# Patient Record
Sex: MALE | Race: BLACK OR AFRICAN AMERICAN | NOT HISPANIC OR LATINO
[De-identification: names, ages, dates, MRNs, and addresses within clinical notes are randomized per-mention and may not be internally consistent; named-entity substitution may affect disease eponyms.]

---

## 2018-11-14 ENCOUNTER — APPOINTMENT (OUTPATIENT)
Dept: NUCLEAR MEDICINE | Facility: HOSPITAL | Age: 43
End: 2018-11-14

## 2019-01-30 ENCOUNTER — APPOINTMENT (OUTPATIENT)
Dept: SPINE | Facility: CLINIC | Age: 44
End: 2019-01-30
Payer: MEDICARE

## 2019-01-30 VITALS
WEIGHT: 168 LBS | DIASTOLIC BLOOD PRESSURE: 77 MMHG | SYSTOLIC BLOOD PRESSURE: 145 MMHG | HEART RATE: 80 BPM | HEIGHT: 67.5 IN | BODY MASS INDEX: 26.06 KG/M2

## 2019-01-30 DIAGNOSIS — Z80.0 FAMILY HISTORY OF MALIGNANT NEOPLASM OF DIGESTIVE ORGANS: ICD-10-CM

## 2019-01-30 DIAGNOSIS — Z82.49 FAMILY HISTORY OF ISCHEMIC HEART DISEASE AND OTHER DISEASES OF THE CIRCULATORY SYSTEM: ICD-10-CM

## 2019-01-30 DIAGNOSIS — Z83.3 FAMILY HISTORY OF DIABETES MELLITUS: ICD-10-CM

## 2019-01-30 DIAGNOSIS — Z86.69 PERSONAL HISTORY OF OTHER DISEASES OF THE NERVOUS SYSTEM AND SENSE ORGANS: ICD-10-CM

## 2019-01-30 PROCEDURE — 99204 OFFICE O/P NEW MOD 45 MIN: CPT

## 2019-01-30 RX ORDER — LOSARTAN POTASSIUM 50 MG/1
50 TABLET, FILM COATED ORAL
Refills: 0 | Status: ACTIVE | COMMUNITY

## 2019-01-30 RX ORDER — LEVOCETIRIZINE DIHYDROCHLORIDE 5 MG/1
5 TABLET, FILM COATED ORAL
Refills: 0 | Status: ACTIVE | COMMUNITY

## 2019-02-25 ENCOUNTER — OUTPATIENT (OUTPATIENT)
Dept: OUTPATIENT SERVICES | Facility: HOSPITAL | Age: 44
LOS: 1 days | End: 2019-02-25
Payer: MEDICARE

## 2019-02-25 ENCOUNTER — APPOINTMENT (OUTPATIENT)
Dept: MRI IMAGING | Facility: CLINIC | Age: 44
End: 2019-02-25
Payer: MEDICARE

## 2019-02-25 ENCOUNTER — APPOINTMENT (OUTPATIENT)
Dept: RADIOLOGY | Facility: CLINIC | Age: 44
End: 2019-02-25
Payer: MEDICARE

## 2019-02-25 DIAGNOSIS — G91.9 HYDROCEPHALUS, UNSPECIFIED: ICD-10-CM

## 2019-02-25 DIAGNOSIS — Z00.8 ENCOUNTER FOR OTHER GENERAL EXAMINATION: ICD-10-CM

## 2019-02-25 PROCEDURE — 70250 X-RAY EXAM OF SKULL: CPT

## 2019-02-25 PROCEDURE — 76499 UNLISTED DX RADIOGRAPHIC PX: CPT

## 2019-02-25 PROCEDURE — 71046 X-RAY EXAM CHEST 2 VIEWS: CPT | Mod: 26

## 2019-02-25 PROCEDURE — 70551 MRI BRAIN STEM W/O DYE: CPT | Mod: 26

## 2019-02-25 PROCEDURE — 71046 X-RAY EXAM CHEST 2 VIEWS: CPT

## 2019-02-25 PROCEDURE — 74018 RADEX ABDOMEN 1 VIEW: CPT | Mod: 26

## 2019-02-25 PROCEDURE — 70250 X-RAY EXAM OF SKULL: CPT | Mod: 26

## 2019-02-25 PROCEDURE — 70551 MRI BRAIN STEM W/O DYE: CPT

## 2019-03-07 ENCOUNTER — OUTPATIENT (OUTPATIENT)
Dept: OUTPATIENT SERVICES | Facility: HOSPITAL | Age: 44
LOS: 1 days | End: 2019-03-07
Payer: MEDICARE

## 2019-03-07 VITALS
HEIGHT: 67 IN | TEMPERATURE: 98 F | SYSTOLIC BLOOD PRESSURE: 150 MMHG | WEIGHT: 179.9 LBS | DIASTOLIC BLOOD PRESSURE: 90 MMHG | HEART RATE: 82 BPM | RESPIRATION RATE: 16 BRPM | OXYGEN SATURATION: 96 %

## 2019-03-07 DIAGNOSIS — G91.9 HYDROCEPHALUS, UNSPECIFIED: ICD-10-CM

## 2019-03-07 DIAGNOSIS — I10 ESSENTIAL (PRIMARY) HYPERTENSION: ICD-10-CM

## 2019-03-07 DIAGNOSIS — Z92.89 PERSONAL HISTORY OF OTHER MEDICAL TREATMENT: Chronic | ICD-10-CM

## 2019-03-07 DIAGNOSIS — Z98.2 PRESENCE OF CEREBROSPINAL FLUID DRAINAGE DEVICE: Chronic | ICD-10-CM

## 2019-03-07 DIAGNOSIS — K40.20 BILATERAL INGUINAL HERNIA, WITHOUT OBSTRUCTION OR GANGRENE, NOT SPECIFIED AS RECURRENT: Chronic | ICD-10-CM

## 2019-03-07 LAB
ANION GAP SERPL CALC-SCNC: 14 MMO/L — SIGNIFICANT CHANGE UP (ref 7–14)
BLD GP AB SCN SERPL QL: NEGATIVE — SIGNIFICANT CHANGE UP
BUN SERPL-MCNC: 14 MG/DL — SIGNIFICANT CHANGE UP (ref 7–23)
CALCIUM SERPL-MCNC: 9.2 MG/DL — SIGNIFICANT CHANGE UP (ref 8.4–10.5)
CHLORIDE SERPL-SCNC: 101 MMOL/L — SIGNIFICANT CHANGE UP (ref 98–107)
CO2 SERPL-SCNC: 24 MMOL/L — SIGNIFICANT CHANGE UP (ref 22–31)
CREAT SERPL-MCNC: 1.11 MG/DL — SIGNIFICANT CHANGE UP (ref 0.5–1.3)
GLUCOSE SERPL-MCNC: 102 MG/DL — HIGH (ref 70–99)
HCT VFR BLD CALC: 43.5 % — SIGNIFICANT CHANGE UP (ref 39–50)
HGB BLD-MCNC: 14.5 G/DL — SIGNIFICANT CHANGE UP (ref 13–17)
MCHC RBC-ENTMCNC: 29.9 PG — SIGNIFICANT CHANGE UP (ref 27–34)
MCHC RBC-ENTMCNC: 33.3 % — SIGNIFICANT CHANGE UP (ref 32–36)
MCV RBC AUTO: 89.7 FL — SIGNIFICANT CHANGE UP (ref 80–100)
NRBC # FLD: 0 K/UL — LOW (ref 25–125)
PLATELET # BLD AUTO: 228 K/UL — SIGNIFICANT CHANGE UP (ref 150–400)
PMV BLD: 11.2 FL — SIGNIFICANT CHANGE UP (ref 7–13)
POTASSIUM SERPL-MCNC: 3.7 MMOL/L — SIGNIFICANT CHANGE UP (ref 3.5–5.3)
POTASSIUM SERPL-SCNC: 3.7 MMOL/L — SIGNIFICANT CHANGE UP (ref 3.5–5.3)
RBC # BLD: 4.85 M/UL — SIGNIFICANT CHANGE UP (ref 4.2–5.8)
RBC # FLD: 13.3 % — SIGNIFICANT CHANGE UP (ref 10.3–14.5)
RH IG SCN BLD-IMP: POSITIVE — SIGNIFICANT CHANGE UP
SODIUM SERPL-SCNC: 139 MMOL/L — SIGNIFICANT CHANGE UP (ref 135–145)
WBC # BLD: 4.14 K/UL — SIGNIFICANT CHANGE UP (ref 3.8–10.5)
WBC # FLD AUTO: 4.14 K/UL — SIGNIFICANT CHANGE UP (ref 3.8–10.5)

## 2019-03-07 PROCEDURE — 93010 ELECTROCARDIOGRAM REPORT: CPT

## 2019-03-07 NOTE — H&P PST ADULT - GASTROINTESTINAL DETAILS
nontender/soft/no rebound tenderness/no rigidity/no organomegaly/no distention/no masses palpable/bowel sounds normal/no guarding/no bruit

## 2019-03-07 NOTE — H&P PST ADULT - RS GEN PE MLT RESP DETAILS PC
no wheezes/no rales/breath sounds equal/clear to auscultation bilaterally/no rhonchi/respirations non-labored

## 2019-03-07 NOTE — H&P PST ADULT - HISTORY OF PRESENT ILLNESS
43 yr old male with medical hx htn and hydrocephalus, pmhx stroke @ 11 yrs old during  shunt revision presents for preop evaluation with c/o right neck lump x ~ 9 months.  Pt was evaluated by  Neuro s/p MRI.  As per patient "the shunt is not working it has a slow leak".  Pt is now scheduled for Stereotactic Endoscopic Third Ventriculostomy, Possible Ventriculoperitoneal Shunt Revision on 03/18/19. 43 yr old male with medical hx htn and congenital hydrocephalus, pmhx stroke @ 11 yrs old during  shunt revision presents for preop evaluation with c/o right neck lump x ~ 9 months.  Pt was evaluated by  Neuro s/p MRI.  As per patient "the shunt is not working it has a slow leak".  Pt is now scheduled for Stereotactic Endoscopic Third Ventriculostomy, Possible Ventriculoperitoneal Shunt Revision on 03/18/19. 43 yr old male with medical hx htn and congenital hydrocephalus, pmhx stroke @ 11 yrs old during  shunt revision presents for preop evaluation with c/o right neck lump x ~ 9 months (of note patient is a poor historian.) Pt was evaluated by  Neuro s/p MRI.  As per patient "the shunt is not working it has a slow leak".  Pt is now scheduled for Stereotactic Endoscopic Third Ventriculostomy, Possible Ventriculoperitoneal Shunt Revision on 03/18/19.

## 2019-03-07 NOTE — H&P PST ADULT - PROBLEM SELECTOR PLAN 1
Scheduled for Stereotactic Endoscopic Third Ventriculostomy, Possible Ventriculoperitoneal Shunt Revision on 3/18/19.  Lab results pending.  Preop, famotidine and chlorhexidine instructions provided and questions addressed.

## 2019-03-07 NOTE — H&P PST ADULT - FAMILY HISTORY
Father  Still living? Unknown  Family history of hypertension, Age at diagnosis: Age Unknown     Mother  Still living? Yes, Estimated age: Age Unknown  Family history of hypertension, Age at diagnosis: Age Unknown     Sibling  Still living? Yes, Estimated age: Age Unknown  Family history of hypertension in brother, Age at diagnosis: Age Unknown

## 2019-03-07 NOTE — H&P PST ADULT - PROBLEM SELECTOR PLAN 2
Pt instructed to take med on the morning of procedure.  Pt is scheduled for medical evaluation on 03/11/19 for bp at pst 150/90.

## 2019-03-07 NOTE — H&P PST ADULT - NEGATIVE GASTROINTESTINAL SYMPTOMS
no vomiting/no constipation/no steatorrhea/no abdominal pain/no melena/no hematochezia/no jaundice/no hiccoughs/no diarrhea/no nausea

## 2019-03-07 NOTE — H&P PST ADULT - PSH
Bilateral inguinal hernia    History of creation of ventriculoperitoneal shunt  at couple months old in [place] - left shunt currently not working  S/P  shunt  at 11 yrs old at that time patient had stroke

## 2019-03-22 NOTE — ASU PATIENT PROFILE, ADULT - PSH
Bilateral inguinal hernia    History of creation of ventriculoperitoneal shunt  at couple months old in [place] - left shunt currently not working  S/P  shunt  at 11 yrs old at that time patient had stroke

## 2019-03-24 ENCOUNTER — TRANSCRIPTION ENCOUNTER (OUTPATIENT)
Age: 44
End: 2019-03-24

## 2019-03-25 ENCOUNTER — INPATIENT (INPATIENT)
Facility: HOSPITAL | Age: 44
LOS: 1 days | Discharge: HOME | End: 2019-03-27
Attending: NEUROLOGICAL SURGERY | Admitting: NEUROLOGICAL SURGERY
Payer: MEDICARE

## 2019-03-25 VITALS
RESPIRATION RATE: 16 BRPM | HEART RATE: 93 BPM | HEIGHT: 67 IN | DIASTOLIC BLOOD PRESSURE: 82 MMHG | SYSTOLIC BLOOD PRESSURE: 156 MMHG | OXYGEN SATURATION: 95 % | WEIGHT: 179.9 LBS | TEMPERATURE: 98 F

## 2019-03-25 DIAGNOSIS — K40.20 BILATERAL INGUINAL HERNIA, WITHOUT OBSTRUCTION OR GANGRENE, NOT SPECIFIED AS RECURRENT: Chronic | ICD-10-CM

## 2019-03-25 DIAGNOSIS — Z92.89 PERSONAL HISTORY OF OTHER MEDICAL TREATMENT: Chronic | ICD-10-CM

## 2019-03-25 DIAGNOSIS — G91.9 HYDROCEPHALUS, UNSPECIFIED: ICD-10-CM

## 2019-03-25 DIAGNOSIS — Z98.2 PRESENCE OF CEREBROSPINAL FLUID DRAINAGE DEVICE: Chronic | ICD-10-CM

## 2019-03-25 DIAGNOSIS — Z98.890 OTHER SPECIFIED POSTPROCEDURAL STATES: ICD-10-CM

## 2019-03-25 LAB
CLARITY CSF: CLEAR — SIGNIFICANT CHANGE UP
COLOR CSF: COLORLESS — SIGNIFICANT CHANGE UP
GLUCOSE CSF-MCNC: 34 MG/DL — LOW (ref 40–70)
GRAM STN CSF: SIGNIFICANT CHANGE UP
LYMPHOCYTES # CSF: 91 % — SIGNIFICANT CHANGE UP
MONOCYTES # CSF: 9 % — SIGNIFICANT CHANGE UP
NRBC NFR CSF: 1 CELL/UL — SIGNIFICANT CHANGE UP (ref 0–5)
PROT CSF-MCNC: 4.6 MG/DL — LOW (ref 15–40)
RBC # CSF: 99 CELL/UL — HIGH (ref 0–0)
RH IG SCN BLD-IMP: POSITIVE — SIGNIFICANT CHANGE UP
SPECIMEN SOURCE: SIGNIFICANT CHANGE UP
TOTAL CELLS COUNTED, SPINAL FLUID: 11 CELLS — SIGNIFICANT CHANGE UP
XANTHOCHROMIA: SIGNIFICANT CHANGE UP

## 2019-03-25 PROCEDURE — 70450 CT HEAD/BRAIN W/O DYE: CPT | Mod: 26

## 2019-03-25 RX ORDER — ACETAMINOPHEN 500 MG
650 TABLET ORAL EVERY 6 HOURS
Qty: 0 | Refills: 0 | Status: DISCONTINUED | OUTPATIENT
Start: 2019-03-25 | End: 2019-03-27

## 2019-03-25 RX ORDER — OXYCODONE HYDROCHLORIDE 5 MG/1
10 TABLET ORAL ONCE
Qty: 0 | Refills: 0 | Status: DISCONTINUED | OUTPATIENT
Start: 2019-03-25 | End: 2019-03-26

## 2019-03-25 RX ORDER — OXYCODONE HYDROCHLORIDE 5 MG/1
5 TABLET ORAL EVERY 4 HOURS
Qty: 0 | Refills: 0 | Status: DISCONTINUED | OUTPATIENT
Start: 2019-03-25 | End: 2019-03-27

## 2019-03-25 RX ORDER — INFLUENZA VIRUS VACCINE 15; 15; 15; 15 UG/.5ML; UG/.5ML; UG/.5ML; UG/.5ML
0.5 SUSPENSION INTRAMUSCULAR ONCE
Qty: 0 | Refills: 0 | Status: COMPLETED | OUTPATIENT
Start: 2019-03-25 | End: 2019-03-25

## 2019-03-25 RX ORDER — DOCUSATE SODIUM 100 MG
100 CAPSULE ORAL THREE TIMES A DAY
Qty: 0 | Refills: 0 | Status: DISCONTINUED | OUTPATIENT
Start: 2019-03-25 | End: 2019-03-27

## 2019-03-25 RX ORDER — FENTANYL CITRATE 50 UG/ML
50 INJECTION INTRAVENOUS
Qty: 0 | Refills: 0 | Status: DISCONTINUED | OUTPATIENT
Start: 2019-03-25 | End: 2019-03-26

## 2019-03-25 RX ORDER — SODIUM CHLORIDE 9 MG/ML
1000 INJECTION INTRAMUSCULAR; INTRAVENOUS; SUBCUTANEOUS
Qty: 0 | Refills: 0 | Status: DISCONTINUED | OUTPATIENT
Start: 2019-03-25 | End: 2019-03-25

## 2019-03-25 RX ORDER — LEVETIRACETAM 250 MG/1
500 TABLET, FILM COATED ORAL EVERY 12 HOURS
Qty: 0 | Refills: 0 | Status: DISCONTINUED | OUTPATIENT
Start: 2019-03-25 | End: 2019-03-27

## 2019-03-25 RX ORDER — SODIUM CHLORIDE 9 MG/ML
1000 INJECTION, SOLUTION INTRAVENOUS
Qty: 0 | Refills: 0 | Status: DISCONTINUED | OUTPATIENT
Start: 2019-03-25 | End: 2019-03-25

## 2019-03-25 RX ORDER — DEXAMETHASONE 0.5 MG/5ML
4 ELIXIR ORAL EVERY 6 HOURS
Qty: 0 | Refills: 0 | Status: DISCONTINUED | OUTPATIENT
Start: 2019-03-25 | End: 2019-03-27

## 2019-03-25 RX ORDER — LOSARTAN POTASSIUM 100 MG/1
50 TABLET, FILM COATED ORAL DAILY
Qty: 0 | Refills: 0 | Status: DISCONTINUED | OUTPATIENT
Start: 2019-03-25 | End: 2019-03-27

## 2019-03-25 RX ORDER — ONDANSETRON 8 MG/1
4 TABLET, FILM COATED ORAL ONCE
Qty: 0 | Refills: 0 | Status: COMPLETED | OUTPATIENT
Start: 2019-03-25 | End: 2019-03-25

## 2019-03-25 RX ORDER — FENTANYL CITRATE 50 UG/ML
25 INJECTION INTRAVENOUS
Qty: 0 | Refills: 0 | Status: DISCONTINUED | OUTPATIENT
Start: 2019-03-25 | End: 2019-03-26

## 2019-03-25 RX ORDER — CEFAZOLIN SODIUM 1 G
2000 VIAL (EA) INJECTION EVERY 8 HOURS
Qty: 0 | Refills: 0 | Status: COMPLETED | OUTPATIENT
Start: 2019-03-25 | End: 2019-03-26

## 2019-03-25 RX ORDER — SODIUM CHLORIDE 9 MG/ML
1000 INJECTION INTRAMUSCULAR; INTRAVENOUS; SUBCUTANEOUS
Qty: 0 | Refills: 0 | Status: DISCONTINUED | OUTPATIENT
Start: 2019-03-25 | End: 2019-03-26

## 2019-03-25 RX ORDER — OXYCODONE HYDROCHLORIDE 5 MG/1
10 TABLET ORAL EVERY 4 HOURS
Qty: 0 | Refills: 0 | Status: DISCONTINUED | OUTPATIENT
Start: 2019-03-25 | End: 2019-03-27

## 2019-03-25 RX ADMIN — ONDANSETRON 4 MILLIGRAM(S): 8 TABLET, FILM COATED ORAL at 17:09

## 2019-03-25 RX ADMIN — Medication 100 MILLIGRAM(S): at 23:29

## 2019-03-25 RX ADMIN — LEVETIRACETAM 500 MILLIGRAM(S): 250 TABLET, FILM COATED ORAL at 20:59

## 2019-03-25 RX ADMIN — OXYCODONE HYDROCHLORIDE 5 MILLIGRAM(S): 5 TABLET ORAL at 17:09

## 2019-03-25 RX ADMIN — Medication 4 MILLIGRAM(S): at 20:59

## 2019-03-25 RX ADMIN — SODIUM CHLORIDE 75 MILLILITER(S): 9 INJECTION INTRAMUSCULAR; INTRAVENOUS; SUBCUTANEOUS at 17:13

## 2019-03-25 NOTE — CONSULT NOTE ADULT - SUBJECTIVE AND OBJECTIVE BOX
SICU Consultation Note  =====================================================    Surgery Information  ***  Case Duration: 	EBL: 15ml	IV Fluids: 1000ml	Blood Products: 	Urine Output:   Complications:     HISTORY    HPI:  43 yr old male with medical hx htn and congenital hydrocephalus, pmhx stroke @ 11 yrs old during  shunt revision presents for preop evaluation with c/o right neck lump x ~ 9 months (of note patient is a poor historian.) Pt was evaluated by  Neuro s/p MRI.  As per patient "the shunt is not working it has a slow leak".  Pt is now POD0 for Stereotactic Endoscopic Third Ventriculostomy, Ventriculoperitoneal Shunt Revision on 03/18/19.  Surgery was uncomplicated, patient tolerated procedure well.  Resting comfortably in PACU, with no new neurologic deficits.    Allergies:   PAST MEDICAL & SURGICAL HISTORY:  Hydrocephalus  Stroke  Essential hypertension  Bilateral inguinal hernia  History of creation of ventriculoperitoneal shunt: at couple months old in [place] - left shunt currently not working  S/P  shunt: at 11 yrs old at that time patient had stroke    FAMILY HISTORY:  Family history of hypertension in brother (Sibling)  Family history of hypertension (Father, Mother): mother and father      SOCIAL HISTORY:  ***    ADVANCE DIRECTIVES: Presumed Full Code  ***    REVIEW OF SYSTEMS:  ***  General: Non-Contributory  Skin/Breast: Non-Contributory  Ophthalmologic: Non-Contributory  ENMT: Non-Contributory  Respiratory and Thorax: Non-Contributory  Cardiovascular: Non-Contributory  Gastrointestinal: Non-Contributory  Genitourinary: Non-Contributory  Musculoskeletal: Non-Contributory  Neurological: Non-Contributory  Psychiatric: Non-Contributory  Hematology/Lymphatics: Non-Contributory  Endocrine: Non-Contributory  Allergic/Immunologic: Non-Contributory    HOME MEDICATIONS:  ***    CURRENT MEDICATIONS:   --------------------------------------------------------------------------------------  Neurologic Medications  acetaminophen   Tablet .. 650 milliGRAM(s) Oral every 6 hours PRN Temp greater or equal to 38C (100.4F), Mild Pain (1 - 3)  fentaNYL    Injectable 25 MICROGram(s) IV Push every 5 minutes PRN Moderate Pain (4 - 6)  fentaNYL    Injectable 50 MICROGram(s) IV Push every 5 minutes PRN Severe Pain (7 - 10)  levETIRAcetam 500 milliGRAM(s) Oral every 12 hours  oxyCODONE    IR 5 milliGRAM(s) Oral every 4 hours PRN Moderate Pain (4 - 6)  oxyCODONE    IR 10 milliGRAM(s) Oral every 4 hours PRN Severe Pain (7 - 10)  oxyCODONE    IR 10 milliGRAM(s) Oral once PRN Severe Pain (7 - 10)    Respiratory Medications    Cardiovascular Medications  losartan 50 milliGRAM(s) Oral daily    Gastrointestinal Medications  docusate sodium 100 milliGRAM(s) Oral three times a day  lactated ringers. 1000 milliLiter(s) IV Continuous <Continuous>  sodium chloride 0.9%. 1000 milliLiter(s) IV Continuous <Continuous>    Genitourinary Medications    Hematologic/Oncologic Medications    Antimicrobial/Immunologic Medications  ceFAZolin   IVPB 2000 milliGRAM(s) IV Intermittent every 8 hours    Endocrine/Metabolic Medications  dexamethasone     Tablet 4 milliGRAM(s) Oral every 6 hours    Topical/Other Medications    --------------------------------------------------------------------------------------    VITAL SIGNS, INS/OUTS (last 24 hours):  --------------------------------------------------------------------------------------  ((Insert SICU Vitals / Is+Os here)) ***  --------------------------------------------------------------------------------------    EXAM  NEUROLOGY  RASS:   	GCS:    Exam: Normal, NAD, alert, oriented x 3, no focal deficits.  5/5 strength bilaterally.  Patient at baseline strength on left side.    HEENT  Exam: Normocephalic, atraumatic.  EOMI ***    RESPIRATORY  Exam: Lungs clear to auscultation, Normal expansion/effort.  ***  Mechanical Ventilation:     CARDIOVASCULAR  Exam: S1, S2.  Tachycardic, normal sinus rhythm.  No peripheral edema     GI/NUTRITION  Exam: Abdomen soft, Non-tender, Non-distended.    Wound:   ***  Current Diet:  NPO    VASCULAR  Exam: Extremities warm, pink, well-perfused.      MUSCULOSKELETAL  Exam: All extremities moving spontaneously without limitations.      SKIN:  Exam: Good skin turgor, no skin breakdown.      METABOLIC/FLUIDS/ELECTROLYTES  lactated ringers. 1000 milliLiter(s) IV Continuous <Continuous>  sodium chloride 0.9%. 1000 milliLiter(s) IV Continuous <Continuous>      HEMATOLOGIC  [] DVT Prophylaxis:   Transfusions:	[] PRBC	[] Platelets		[] FFP	[] Cryoprecipitate    INFECTIOUS DISEASE  Antimicrobials/Immunologic Medications:  ceFAZolin   IVPB 2000 milliGRAM(s) IV Intermittent every 8 hours    Day #  	of    ***    Tubes/Lines/Drains  ***  [x] Peripheral IV  [] Central Venous Line     	[] R	[] L	[] IJ	[] Fem	[] SC	Date Placed:   [] Arterial Line		[] R	[] L	[] Fem	[] Rad	[] Ax	Date Placed:   [] PICC:         	[] Midline		[] Mediport  [] Urinary Catheter		Date Placed:     LABS  --------------------------------------------------------------------------------------  ((Insert SICU Labs here))***  --------------------------------------------------------------------------------------    OTHER LABS    IMAGING RESULTS  Echo:   CT:   Xray:     ASSESSMENT:  43y Male ***    PLAN:  ***  Neurologic:   Respiratory:   Cardiovascular:   Gastrointestinal/Nutrition:   Renal/Genitourinary:   Hematologic:   Infectious Disease:   Tubes/Lines/Drains:   Endocrine:   Disposition:     --------------------------------------------------------------------------------------    Critical Care Diagnoses: hydrocephalus

## 2019-03-25 NOTE — CONSULT NOTE ADULT - ASSESSMENT
ASSESSMENT:  43 yr old male PMH HTN, congenital hydrocephalus c/b stroke @ 11 yrs old during  shunt revision with baseline left sided weakness p/w malfunctioning  shunt, now POD0 for Stereotactic Endoscopic Third Ventriculostomy, Ventriculoperitoneal Shunt Revision on 03/18/19.    PLAN:      Neurologic:   -Tylenol, Oxycodone PRN for pain control  -No focal deficits on exam  -F/U CT head and MRI head  -Q1H neuro checks  -Decadron 4mg Q6H    Respiratory:   -Extubated, satting well on room air.  -Chest PT as required.    Cardiovascular:   -Tachycardic, likely 2/2 pain  -HD stable.  -Strict Is and Os    Gastrointestinal/Nutrition:   -Regular diet    Renal/Genitourinary:   -Monitor electrolytes    Hematologic:   -Will restart DVT PPX in 24 hours post op.    Infectious Disease:   -Periop ancef    Tubes/Lines/Drains:   PIV    Endocrine:   -no active issues    Disposition: SICU

## 2019-03-26 LAB
ANION GAP SERPL CALC-SCNC: 11 MMO/L — SIGNIFICANT CHANGE UP (ref 7–14)
BUN SERPL-MCNC: 14 MG/DL — SIGNIFICANT CHANGE UP (ref 7–23)
CALCIUM SERPL-MCNC: 8.8 MG/DL — SIGNIFICANT CHANGE UP (ref 8.4–10.5)
CHLORIDE SERPL-SCNC: 104 MMOL/L — SIGNIFICANT CHANGE UP (ref 98–107)
CO2 SERPL-SCNC: 24 MMOL/L — SIGNIFICANT CHANGE UP (ref 22–31)
CREAT SERPL-MCNC: 1.06 MG/DL — SIGNIFICANT CHANGE UP (ref 0.5–1.3)
GLUCOSE SERPL-MCNC: 135 MG/DL — HIGH (ref 70–99)
HCT VFR BLD CALC: 42.8 % — SIGNIFICANT CHANGE UP (ref 39–50)
HGB BLD-MCNC: 14.3 G/DL — SIGNIFICANT CHANGE UP (ref 13–17)
MAGNESIUM SERPL-MCNC: 2 MG/DL — SIGNIFICANT CHANGE UP (ref 1.6–2.6)
MCHC RBC-ENTMCNC: 30 PG — SIGNIFICANT CHANGE UP (ref 27–34)
MCHC RBC-ENTMCNC: 33.4 % — SIGNIFICANT CHANGE UP (ref 32–36)
MCV RBC AUTO: 89.7 FL — SIGNIFICANT CHANGE UP (ref 80–100)
NRBC # FLD: 0 K/UL — SIGNIFICANT CHANGE UP (ref 0–0)
PHOSPHATE SERPL-MCNC: 3.1 MG/DL — SIGNIFICANT CHANGE UP (ref 2.5–4.5)
PLATELET # BLD AUTO: 213 K/UL — SIGNIFICANT CHANGE UP (ref 150–400)
PMV BLD: 11 FL — SIGNIFICANT CHANGE UP (ref 7–13)
POTASSIUM SERPL-MCNC: 4.2 MMOL/L — SIGNIFICANT CHANGE UP (ref 3.5–5.3)
POTASSIUM SERPL-SCNC: 4.2 MMOL/L — SIGNIFICANT CHANGE UP (ref 3.5–5.3)
RBC # BLD: 4.77 M/UL — SIGNIFICANT CHANGE UP (ref 4.2–5.8)
RBC # FLD: 13.2 % — SIGNIFICANT CHANGE UP (ref 10.3–14.5)
SODIUM SERPL-SCNC: 139 MMOL/L — SIGNIFICANT CHANGE UP (ref 135–145)
WBC # BLD: 6.25 K/UL — SIGNIFICANT CHANGE UP (ref 3.8–10.5)
WBC # FLD AUTO: 6.25 K/UL — SIGNIFICANT CHANGE UP (ref 3.8–10.5)

## 2019-03-26 PROCEDURE — 70551 MRI BRAIN STEM W/O DYE: CPT | Mod: 26

## 2019-03-26 RX ORDER — SODIUM CHLORIDE 9 MG/ML
1000 INJECTION INTRAMUSCULAR; INTRAVENOUS; SUBCUTANEOUS
Qty: 0 | Refills: 0 | Status: DISCONTINUED | OUTPATIENT
Start: 2019-03-26 | End: 2019-03-27

## 2019-03-26 RX ADMIN — LEVETIRACETAM 500 MILLIGRAM(S): 250 TABLET, FILM COATED ORAL at 18:06

## 2019-03-26 RX ADMIN — LOSARTAN POTASSIUM 50 MILLIGRAM(S): 100 TABLET, FILM COATED ORAL at 06:33

## 2019-03-26 RX ADMIN — Medication 4 MILLIGRAM(S): at 18:06

## 2019-03-26 RX ADMIN — OXYCODONE HYDROCHLORIDE 10 MILLIGRAM(S): 5 TABLET ORAL at 00:45

## 2019-03-26 RX ADMIN — OXYCODONE HYDROCHLORIDE 10 MILLIGRAM(S): 5 TABLET ORAL at 01:15

## 2019-03-26 RX ADMIN — OXYCODONE HYDROCHLORIDE 10 MILLIGRAM(S): 5 TABLET ORAL at 21:20

## 2019-03-26 RX ADMIN — OXYCODONE HYDROCHLORIDE 10 MILLIGRAM(S): 5 TABLET ORAL at 21:50

## 2019-03-26 RX ADMIN — OXYCODONE HYDROCHLORIDE 10 MILLIGRAM(S): 5 TABLET ORAL at 07:15

## 2019-03-26 RX ADMIN — Medication 100 MILLIGRAM(S): at 14:07

## 2019-03-26 RX ADMIN — Medication 100 MILLIGRAM(S): at 06:28

## 2019-03-26 RX ADMIN — LEVETIRACETAM 500 MILLIGRAM(S): 250 TABLET, FILM COATED ORAL at 06:28

## 2019-03-26 RX ADMIN — Medication 4 MILLIGRAM(S): at 11:56

## 2019-03-26 RX ADMIN — OXYCODONE HYDROCHLORIDE 10 MILLIGRAM(S): 5 TABLET ORAL at 07:45

## 2019-03-26 RX ADMIN — SODIUM CHLORIDE 100 MILLILITER(S): 9 INJECTION INTRAMUSCULAR; INTRAVENOUS; SUBCUTANEOUS at 14:07

## 2019-03-26 RX ADMIN — Medication 4 MILLIGRAM(S): at 06:28

## 2019-03-26 RX ADMIN — Medication 100 MILLIGRAM(S): at 21:20

## 2019-03-26 RX ADMIN — SODIUM CHLORIDE 100 MILLILITER(S): 9 INJECTION INTRAMUSCULAR; INTRAVENOUS; SUBCUTANEOUS at 21:22

## 2019-03-26 RX ADMIN — Medication 4 MILLIGRAM(S): at 00:40

## 2019-03-26 NOTE — OCCUPATIONAL THERAPY INITIAL EVALUATION ADULT - PLANNED THERAPY INTERVENTIONS, OT EVAL
transfer training/neuromuscular re-education/bed mobility training/strengthening/ADL retraining/balance training

## 2019-03-26 NOTE — OCCUPATIONAL THERAPY INITIAL EVALUATION ADULT - LIVES WITH, PROFILE
Pt. reports he lives with his parents in a house with 4 steps to enter. Once inside, pt. reports he has a full flight of steps to negotiate to reach basement where main bedroom and bathroom are located. Per pt., he has a shower stall in his bathroom.

## 2019-03-26 NOTE — OCCUPATIONAL THERAPY INITIAL EVALUATION ADULT - GENERAL OBSERVATIONS, REHAB EVAL
Pt. received semisupine in bed. No acute distress. No acute distress. Patient agreed to evaluation from Occupational Therapist. (+) steri strips on head. Girlfriend bedside.

## 2019-03-26 NOTE — OCCUPATIONAL THERAPY INITIAL EVALUATION ADULT - MD ORDER
Occupational Therapy (OT) to evaluate and treat. Occupational Therapy (OT) to evaluate and treat. Per GABRIEL Gomez, pt is okay to participate in OT evaluation and perform activity as tolerated.

## 2019-03-26 NOTE — OCCUPATIONAL THERAPY INITIAL EVALUATION ADULT - PERTINENT HX OF CURRENT PROBLEM, REHAB EVAL
Pt is a 43 year old male with medical hx htn and congenital hydrocephalus, pmhx stroke @ 11 yrs old during  shunt revision, who presented to [facility] on 3/25/19 with c/o right neck lump x ~ 9 months. Pt is now s/p endoscopic third ventriculoscopy on 3/25/19.

## 2019-03-26 NOTE — OCCUPATIONAL THERAPY INITIAL EVALUATION ADULT - RANGE OF MOTION EXAMINATION, UPPER EXTREMITY
Left UE: Shoulder Flexion Active ROM 0-80 degrees, Elbow Flexion/Extension Active ROM 25 to 135 degrees, noted wrist extension contracture at 60 degrees of wrist extension, noted hand flexion contracture with passive ROM available from 0-70 degrees at MP joints./Right UE Active ROM was WFL (within functional limits)

## 2019-03-26 NOTE — PHYSICAL THERAPY INITIAL EVALUATION ADULT - ACTIVE RANGE OF MOTION EXAMINATION, REHAB EVAL
bilateral upper extremity Active ROM was WFL (within functional limits)/bilateral  lower extremity Active ROM was WFL (within functional limits)/except left shoulder flexion 0-80 , L elbow extension -25 degrees and L wrist limited movements and left finger flexion contracture.

## 2019-03-26 NOTE — PHYSICAL THERAPY INITIAL EVALUATION ADULT - LIVES WITH, PROFILE
parents/in the basement apartment at home, 4 steps to enter with bilateral handrails , 1 flight of steps to the basement with 1 handrail

## 2019-03-26 NOTE — PHYSICAL THERAPY INITIAL EVALUATION ADULT - PERTINENT HX OF CURRENT PROBLEM, REHAB EVAL
This is a 43 yr old male PMH HTN, congenital hydrocephalus c/b stroke @ 11 yrs old during  shunt revision with baseline left sided weakness p/w malfunctioning  shunt, now POD1 for Stereotactic Endoscopic Third Ventriculostomy, Ventriculoperitoneal Shunt Revision on 03/25/19.

## 2019-03-27 ENCOUNTER — TRANSCRIPTION ENCOUNTER (OUTPATIENT)
Age: 44
End: 2019-03-27

## 2019-03-27 VITALS
OXYGEN SATURATION: 96 % | SYSTOLIC BLOOD PRESSURE: 142 MMHG | RESPIRATION RATE: 17 BRPM | DIASTOLIC BLOOD PRESSURE: 88 MMHG | TEMPERATURE: 98 F | HEART RATE: 81 BPM

## 2019-03-27 RX ORDER — LEVETIRACETAM 250 MG/1
1 TABLET, FILM COATED ORAL
Qty: 14 | Refills: 0 | OUTPATIENT
Start: 2019-03-27 | End: 2019-04-02

## 2019-03-27 RX ORDER — LEVETIRACETAM 250 MG/1
1 TABLET, FILM COATED ORAL
Qty: 0 | Refills: 0 | COMMUNITY
Start: 2019-03-27

## 2019-03-27 RX ORDER — OXYCODONE HYDROCHLORIDE 5 MG/1
1 TABLET ORAL
Qty: 18 | Refills: 0 | OUTPATIENT
Start: 2019-03-27 | End: 2019-03-29

## 2019-03-27 RX ORDER — ACETAMINOPHEN 500 MG
2 TABLET ORAL
Qty: 0 | Refills: 0 | COMMUNITY
Start: 2019-03-27

## 2019-03-27 RX ADMIN — Medication 4 MILLIGRAM(S): at 06:12

## 2019-03-27 RX ADMIN — Medication 4 MILLIGRAM(S): at 12:40

## 2019-03-27 RX ADMIN — Medication 100 MILLIGRAM(S): at 14:26

## 2019-03-27 RX ADMIN — Medication 4 MILLIGRAM(S): at 00:19

## 2019-03-27 RX ADMIN — OXYCODONE HYDROCHLORIDE 5 MILLIGRAM(S): 5 TABLET ORAL at 10:30

## 2019-03-27 RX ADMIN — LEVETIRACETAM 500 MILLIGRAM(S): 250 TABLET, FILM COATED ORAL at 06:12

## 2019-03-27 RX ADMIN — LOSARTAN POTASSIUM 50 MILLIGRAM(S): 100 TABLET, FILM COATED ORAL at 06:13

## 2019-03-27 RX ADMIN — OXYCODONE HYDROCHLORIDE 5 MILLIGRAM(S): 5 TABLET ORAL at 09:32

## 2019-03-27 RX ADMIN — Medication 100 MILLIGRAM(S): at 06:12

## 2019-03-27 NOTE — PROGRESS NOTE ADULT - SUBJECTIVE AND OBJECTIVE BOX
Neurosurgery postop  C/O incisional discomfort  Vital Signs Last 24 Hrs  T(C): 36.9 (25 Mar 2019 20:00), Max: 36.9 (25 Mar 2019 12:11)  T(F): 98.4 (25 Mar 2019 20:00), Max: 98.4 (25 Mar 2019 12:11)  HR: 88 (25 Mar 2019 20:45) (85 - 113)  BP: 139/76 (25 Mar 2019 20:45) (125/94 - 156/82)  BP(mean): 92 (25 Mar 2019 20:45) (87 - 110)  RR: 17 (25 Mar 2019 20:45) (8 - 24)  SpO2: 95% (25 Mar 2019 20:45) (94% - 100%)    AAO X 3  PERRLA, EOMI  Mild left ptosis, CN 2-12 otherwise grossly intact  SCHRADER, Left UE mildly contracted, strength 4-/5  LLE 4+/5  Right UE/LE 5/5  SILT    Dressing C/D/I    MEDICATIONS  (STANDING):  ceFAZolin   IVPB 2000 milliGRAM(s) IV Intermittent every 8 hours  dexamethasone     Tablet 4 milliGRAM(s) Oral every 6 hours  docusate sodium 100 milliGRAM(s) Oral three times a day  levETIRAcetam 500 milliGRAM(s) Oral every 12 hours  losartan 50 milliGRAM(s) Oral daily  sodium chloride 0.9%. 1000 milliLiter(s) (75 mL/Hr) IV Continuous <Continuous>    MEDICATIONS  (PRN):  acetaminophen   Tablet .. 650 milliGRAM(s) Oral every 6 hours PRN Temp greater or equal to 38C (100.4F), Mild Pain (1 - 3)  fentaNYL    Injectable 25 MICROGram(s) IV Push every 5 minutes PRN Moderate Pain (4 - 6)  fentaNYL    Injectable 50 MICROGram(s) IV Push every 5 minutes PRN Severe Pain (7 - 10)  oxyCODONE    IR 5 milliGRAM(s) Oral every 4 hours PRN Moderate Pain (4 - 6)  oxyCODONE    IR 10 milliGRAM(s) Oral every 4 hours PRN Severe Pain (7 - 10)  oxyCODONE    IR 10 milliGRAM(s) Oral once PRN Severe Pain (7 - 10)    Postop head CT: Stable ventriculomegaly, no hemorrhage
ANESTHESIA POSTOP CHECK    43y Male POSTOP DAY 1 S/P endoscopic third ventriculoscopy    Vital Signs Last 24 Hrs  T(C): 36.8 (26 Mar 2019 07:00), Max: 37.1 (26 Mar 2019 00:00)  T(F): 98.2 (26 Mar 2019 07:00), Max: 98.8 (26 Mar 2019 00:00)  HR: 97 (26 Mar 2019 08:00) (85 - 113)  BP: 115/81 (26 Mar 2019 08:00) (110/78 - 156/82)  BP(mean): 89 (26 Mar 2019 08:00) (74 - 110)  RR: 15 (26 Mar 2019 08:00) (8 - 24)  SpO2: 95% (26 Mar 2019 08:00) (93% - 100%)  I&O's Summary    25 Mar 2019 07:01  -  26 Mar 2019 07:00  --------------------------------------------------------  IN: 1750 mL / OUT: 1715 mL / NET: 35 mL        [x ] NO APPARENT ANESTHESIA COMPLICATIONS      Comments:
HPI:  43 yr old male with medical hx htn and congenital hydrocephalus, pmhx stroke @ 11 yrs old during  shunt revision presents for preop evaluation with c/o right neck lump x ~ 9 months (of note patient is a poor historian.) Pt was evaluated by  Neuro s/p MRI.  As per patient "the shunt is not working it has a slow leak".  Pt is now POD0 for Stereotactic Endoscopic Third Ventriculostomy, Ventriculoperitoneal Shunt Revision on 03/18/19.  Surgery was uncomplicated, patient tolerated procedure well.  Resting comfortably in PACU, with no new neurologic deficits.    24 HOUR EVENTS: no overnight events.    SUBJECTIVE/ROS:  [ x] A ten-point review of systems was otherwise negative except as noted.  [ ] Due to altered mental status/intubation, subjective information were not able to be obtained from the patient. History was obtained, to the extent possible, from review of the chart and collateral sources of information.      NEURO  RASS:     GCS:     CAM ICU:  Exam: no focal deficits.  Baseline left sided weakness.  Meds: acetaminophen   Tablet .. 650 milliGRAM(s) Oral every 6 hours PRN Temp greater or equal to 38C (100.4F), Mild Pain (1 - 3)  fentaNYL    Injectable 25 MICROGram(s) IV Push every 5 minutes PRN Moderate Pain (4 - 6)  fentaNYL    Injectable 50 MICROGram(s) IV Push every 5 minutes PRN Severe Pain (7 - 10)  levETIRAcetam 500 milliGRAM(s) Oral every 12 hours  oxyCODONE    IR 5 milliGRAM(s) Oral every 4 hours PRN Moderate Pain (4 - 6)  oxyCODONE    IR 10 milliGRAM(s) Oral every 4 hours PRN Severe Pain (7 - 10)  oxyCODONE    IR 10 milliGRAM(s) Oral once PRN Severe Pain (7 - 10)    [x] Adequacy of sedation and pain control has been assessed and adjusted      RESPIRATORY  RR: 20 (03-26-19 @ 00:00) (8 - 24)  SpO2: 96% (03-26-19 @ 00:00) (94% - 100%)  Wt(kg): --  Exam: unlabored, clear to auscultation bilaterally  Mechanical Ventilation:     [ ] Extubation Readiness Assessed  Meds:       CARDIOVASCULAR  HR: 104 (03-26-19 @ 00:00) (85 - 113)  BP: 129/89 (03-26-19 @ 00:00) (125/94 - 156/82)  BP(mean): 96 (03-26-19 @ 00:00) (87 - 110)  ABP: --  ABP(mean): --  Wt(kg): --  CVP(cm H2O): --      Exam:  Cardiac Rhythm: SR  Perfusion     [x ]Adequate   [ ]Inadequate  Mentation   [x ]Normal       [ ]Reduced  Extremities  [ x]Warm         [ ]Cool  Volume Status [ ]Hypervolemic [ x]Euvolemic [ ]Hypovolemic  Meds: losartan 50 milliGRAM(s) Oral daily        GI/NUTRITION  Exam:  Diet: regular  Meds: docusate sodium 100 milliGRAM(s) Oral three times a day      GENITOURINARY  I&O's Detail    03-25 @ 07:01  -  03-26 @ 01:55  --------------------------------------------------------  IN:    Other: 1000 mL    sodium chloride 0.9%.: 450 mL  Total IN: 1450 mL    OUT:    Estimated Blood Loss: 15 mL    Voided: 1300 mL  Total OUT: 1315 mL    Total NET: 135 mL        Weight (kg): 81.6 (03-25 @ 12:11)        [ ] Andersen catheter, indication: N/A  Meds: sodium chloride 0.9%. 1000 milliLiter(s) IV Continuous <Continuous>        HEMATOLOGIC  Meds:   [x] VTE Prophylaxis      Transfusion     [ ] PRBC   [ ] Platelets   [ ] FFP   [ ] Cryoprecipitate      INFECTIOUS DISEASES  T(C): 37.1 (03-26-19 @ 00:00), Max: 37.1 (03-26-19 @ 00:00)  Wt(kg): --    Recent Cultures:  Specimen Source: CEREBRAL SPINAL FLUID, 03-25 @ 19:11; Results --; Gram Stain: --; Organism: --    Meds: ceFAZolin   IVPB 2000 milliGRAM(s) IV Intermittent every 8 hours  influenza   Vaccine 0.5 milliLiter(s) IntraMuscular once        ENDOCRINE  Capillary Blood Glucose    Meds: dexamethasone     Tablet 4 milliGRAM(s) Oral every 6 hours        ACCESS DEVICES:  [x ] Peripheral IV  [ ] Central Venous Line	[ ] R	[ ] L	[ ] IJ	[ ] Fem	[ ] SC	Placed:   [ ] Arterial Line		[ ] R	[ ] L	[ ] Fem	[ ] Rad	[ ] Ax	Placed:   [ ] PICC:					[ ] Mediport  [ ] Urinary Catheter, Date Placed:   [ ] Necessity of urinary, arterial, and venous catheters discussed    OTHER MEDICATIONS:      CODE STATUS: full code    IMAGING:
No issues overnight  ICU Vital Signs Last 24 Hrs  T(C): 37.1 (26 Mar 2019 00:00), Max: 37.1 (26 Mar 2019 00:00)  T(F): 98.8 (26 Mar 2019 00:00), Max: 98.8 (26 Mar 2019 00:00)  HR: 104 (26 Mar 2019 00:00) (85 - 113)  BP: 129/89 (26 Mar 2019 00:00) (125/94 - 156/82)  BP(mean): 96 (26 Mar 2019 00:00) (87 - 110)  ABP: --  ABP(mean): --  RR: 20 (26 Mar 2019 00:00) (8 - 24)  SpO2: 96% (26 Mar 2019 00:00) (94% - 100%)    AAO X 3  PERRLA, EOMI  Mild left ptosis, CN 2-12 otherwise grossly intact  SCHRADER, Left UE mildly contracted, strength 4-/5  LLE 4+/5  Right UE/LE 5/5  SILT    MEDICATIONS  (STANDING):  ceFAZolin   IVPB 2000 milliGRAM(s) IV Intermittent every 8 hours  dexamethasone     Tablet 4 milliGRAM(s) Oral every 6 hours  docusate sodium 100 milliGRAM(s) Oral three times a day  influenza   Vaccine 0.5 milliLiter(s) IntraMuscular once  levETIRAcetam 500 milliGRAM(s) Oral every 12 hours  losartan 50 milliGRAM(s) Oral daily    MEDICATIONS  (PRN):  acetaminophen   Tablet .. 650 milliGRAM(s) Oral every 6 hours PRN Temp greater or equal to 38C (100.4F), Mild Pain (1 - 3)  fentaNYL    Injectable 25 MICROGram(s) IV Push every 5 minutes PRN Moderate Pain (4 - 6)  fentaNYL    Injectable 50 MICROGram(s) IV Push every 5 minutes PRN Severe Pain (7 - 10)  oxyCODONE    IR 5 milliGRAM(s) Oral every 4 hours PRN Moderate Pain (4 - 6)  oxyCODONE    IR 10 milliGRAM(s) Oral every 4 hours PRN Severe Pain (7 - 10)  oxyCODONE    IR 10 milliGRAM(s) Oral once PRN Severe Pain (7 - 10)
No issues overnight  Postop MRI with CSF floe study completed  Vital Signs Last 24 Hrs  T(C): 36.7 (26 Mar 2019 23:43), Max: 37.4 (26 Mar 2019 13:07)  T(F): 98 (26 Mar 2019 23:43), Max: 99.3 (26 Mar 2019 13:07)  HR: 105 (26 Mar 2019 23:43) (86 - 107)  BP: 151/84 (26 Mar 2019 23:43) (110/78 - 151/84)  BP(mean): 89 (26 Mar 2019 08:00) (74 - 92)  RR: 18 (26 Mar 2019 23:43) (13 - 18)  SpO2: 94% (26 Mar 2019 23:43) (93% - 96%)    AAO X 3  PERRLA, EOMI  Mild left ptosis, CN 2-12 otherwise grossly intact  SCHRADER, Left UE mildly contracted, strength 4-/5  LLE 4+/5  Right UE/LE 5/5  SILT    MEDICATIONS  (STANDING):  dexamethasone     Tablet 4 milliGRAM(s) Oral every 6 hours  docusate sodium 100 milliGRAM(s) Oral three times a day  influenza   Vaccine 0.5 milliLiter(s) IntraMuscular once  levETIRAcetam 500 milliGRAM(s) Oral every 12 hours  losartan 50 milliGRAM(s) Oral daily  sodium chloride 0.9%. 1000 milliLiter(s) (100 mL/Hr) IV Continuous <Continuous>    MEDICATIONS  (PRN):  acetaminophen   Tablet .. 650 milliGRAM(s) Oral every 6 hours PRN Temp greater or equal to 38C (100.4F), Mild Pain (1 - 3)  oxyCODONE    IR 5 milliGRAM(s) Oral every 4 hours PRN Moderate Pain (4 - 6)  oxyCODONE    IR 10 milliGRAM(s) Oral every 4 hours PRN Severe Pain (7 - 10)                          14.3   6.25  )-----------( 213      ( 26 Mar 2019 05:20 )             42.8     03-26    139  |  104  |  14  ----------------------------<  135<H>  4.2   |  24  |  1.06    Ca    8.8      26 Mar 2019 05:20  Phos  3.1     03-26  Mg     2.0     03-26

## 2019-03-27 NOTE — PROGRESS NOTE ADULT - ASSESSMENT
42 YO male stable postop ETV, ligation of  shunt
42 YO male stable s/p ETV, ligation of  shunt
44 YO male stable postop ETV, ligation of  shunt
ASSESSMENT:  43 yr old male PMH HTN, congenital hydrocephalus c/b stroke @ 11 yrs old during  shunt revision with baseline left sided weakness p/w malfunctioning  shunt, now POD1 for Stereotactic Endoscopic Third Ventriculostomy, Ventriculoperitoneal Shunt Revision on 03/18/19.    PLAN:      Neurologic:   -Tylenol, Oxycodone PRN for pain control  -No focal deficits on exam  -F/U CT head and MRI head  -Q1H neuro checks  -Decadron 4mg Q6H    Respiratory:   -No active issues.  Satting well on RA.  -Chest PT as required.    Cardiovascular:   -HD stable.  -Strict Is and Os    Gastrointestinal/Nutrition:   -Regular diet    Renal/Genitourinary:   -Monitor electrolytes    Hematologic:   -Will restart DVT PPX in 24 hours post op.    Infectious Disease:   -Periop ancef    Tubes/Lines/Drains:   PIV    Endocrine:   -no active issues    Disposition: SICU

## 2019-03-27 NOTE — PROGRESS NOTE ADULT - PROBLEM SELECTOR PLAN 1
Continue Q1H neurologic checks overnight  Brain MRI with flow study in AM
Continue to monitor  MRI w/CSF flow study today  Discharge planning
Follow up MRI with radiology  Discharge planning

## 2019-03-27 NOTE — DISCHARGE NOTE PROVIDER - HOSPITAL COURSE
HPI:    43 yr old male with medical hx htn and congenital hydrocephalus, pmhx stroke @ 11 yrs old during  shunt revision presents for preop evaluation with c/o right neck lump x ~ 9 months (of note patient is a poor historian.) Pt was evaluated by  Neuro s/p MRI.  As per patient "the shunt is not working it has a slow leak".  Pt is now scheduled for Stereotactic Endoscopic Third Ventriculostomy, Possible Ventriculoperitoneal Shunt Revision on 03/18/19. (07 Mar 2019 11:44)        Pt underwent ETV with ligation of VPS on 3/25 with Dr. Saldana. Pt tolerated procedure well, PT recommends home with outpatient PT.

## 2019-03-27 NOTE — DISCHARGE NOTE PROVIDER - NSDCCPCAREPLAN_GEN_ALL_CORE_FT
PRINCIPAL DISCHARGE DIAGNOSIS  Diagnosis: Hydrocephalus  Assessment and Plan of Treatment: Hydrocephalus

## 2019-03-27 NOTE — DISCHARGE NOTE PROVIDER - CARE PROVIDER_API CALL
Kristian Saldana (MD)  Neurological Surgery; Pediatric Neurological Surgery  [address], [address]  [place], [place] [id number]  Phone: [phone]  Fax: [phone]  Follow Up Time: Kristian Saldana)  Neurological Surgery; Pediatric Neurological Surgery  [address], [address]  [place], [place] [id number]  Phone: [phone]  Fax: [phone]  Follow Up Time: 1 week

## 2019-03-27 NOTE — DISCHARGE NOTE NURSING/CASE MANAGEMENT/SOCIAL WORK - NSDCDPATPORTLINK_GEN_ALL_CORE
You can access the [facility] Patient Portal, offered by [facility], by registering with the following website: [email or web address]

## 2019-03-27 NOTE — DISCHARGE NOTE PROVIDER - NSDCFUADDINST_GEN_ALL_CORE_FT
1. Remove top surgical dressing on post operative day 3 unless it was removed by the surgical team prior to your discharge. Incision should be left uncovered after day 3.   2. Begin showering with shampoo on post operative day 4. Avoid long soaks and do not submerge incision in bathtub. Regular shower only and allow soap and water to run over the incision. Pat incision area dry with clean towel- do not scrub. Please shower regularly to ensure incision stays clean to avoid post operative infections.   3. Notify your surgeon if you notice increased redness, drainage or you notice incision area opening.   4. Return to ER immediately for high fevers, severe headache, vomiting, lethargy or weakness  5. Please call your neurosurgeon following discharge to make follow up appointment in 1 week after discharge unless otherwise specified. See contact information below.   6. Prescription post operative medication, if applicable, are sent to [facility] (unless another pharmacy specified)- [facility] is located in [facility] [place]. All post operative prescriptions should be picked up before departing the hospital.  7. Ambulate as tolerate. Continue with all "activities of daily living." Avoid strenuous activity or lifting more than 10 pounds until cleared for additional activity at your follow up appointment.  8. Do not return to work or school until cleared by your neurosurgeon at your follow up visit unless specified to you during your hospital stay

## 2019-03-31 LAB — BACTERIA CSF CULT: SIGNIFICANT CHANGE UP

## 2019-04-01 ENCOUNTER — OUTPATIENT (OUTPATIENT)
Dept: OUTPATIENT SERVICES | Facility: HOSPITAL | Age: 44
LOS: 1 days | End: 2019-04-01

## 2019-04-01 DIAGNOSIS — K40.20 BILATERAL INGUINAL HERNIA, WITHOUT OBSTRUCTION OR GANGRENE, NOT SPECIFIED AS RECURRENT: Chronic | ICD-10-CM

## 2019-04-01 DIAGNOSIS — Z92.89 PERSONAL HISTORY OF OTHER MEDICAL TREATMENT: Chronic | ICD-10-CM

## 2019-04-01 DIAGNOSIS — Z98.2 PRESENCE OF CEREBROSPINAL FLUID DRAINAGE DEVICE: Chronic | ICD-10-CM

## 2019-04-08 PROBLEM — I10 ESSENTIAL (PRIMARY) HYPERTENSION: Chronic | Status: ACTIVE | Noted: 2019-03-07

## 2019-04-08 PROBLEM — G91.9 HYDROCEPHALUS, UNSPECIFIED: Chronic | Status: ACTIVE | Noted: 2019-03-07

## 2019-04-08 PROBLEM — I63.9 CEREBRAL INFARCTION, UNSPECIFIED: Chronic | Status: ACTIVE | Noted: 2019-03-07

## 2019-04-12 ENCOUNTER — APPOINTMENT (OUTPATIENT)
Dept: MRI IMAGING | Facility: CLINIC | Age: 44
End: 2019-04-12
Payer: MEDICAID

## 2019-04-12 ENCOUNTER — OUTPATIENT (OUTPATIENT)
Dept: OUTPATIENT SERVICES | Facility: HOSPITAL | Age: 44
LOS: 1 days | End: 2019-04-12
Payer: MEDICARE

## 2019-04-12 DIAGNOSIS — Z92.89 PERSONAL HISTORY OF OTHER MEDICAL TREATMENT: Chronic | ICD-10-CM

## 2019-04-12 DIAGNOSIS — G91.9 HYDROCEPHALUS, UNSPECIFIED: ICD-10-CM

## 2019-04-12 DIAGNOSIS — K40.20 BILATERAL INGUINAL HERNIA, WITHOUT OBSTRUCTION OR GANGRENE, NOT SPECIFIED AS RECURRENT: Chronic | ICD-10-CM

## 2019-04-12 DIAGNOSIS — Z98.2 PRESENCE OF CEREBROSPINAL FLUID DRAINAGE DEVICE: Chronic | ICD-10-CM

## 2019-04-12 PROCEDURE — 70551 MRI BRAIN STEM W/O DYE: CPT

## 2019-04-12 PROCEDURE — 70551 MRI BRAIN STEM W/O DYE: CPT | Mod: 26

## 2019-04-28 ENCOUNTER — TRANSCRIPTION ENCOUNTER (OUTPATIENT)
Age: 44
End: 2019-04-28

## 2019-04-28 ENCOUNTER — INPATIENT (INPATIENT)
Facility: HOSPITAL | Age: 44
LOS: 1 days | Discharge: HOME | End: 2019-04-30
Attending: NEUROLOGICAL SURGERY | Admitting: NEUROLOGICAL SURGERY
Payer: MEDICARE

## 2019-04-28 VITALS
RESPIRATION RATE: 16 BRPM | DIASTOLIC BLOOD PRESSURE: 104 MMHG | SYSTOLIC BLOOD PRESSURE: 149 MMHG | OXYGEN SATURATION: 100 % | TEMPERATURE: 98 F | HEART RATE: 85 BPM

## 2019-04-28 DIAGNOSIS — G91.9 HYDROCEPHALUS, UNSPECIFIED: ICD-10-CM

## 2019-04-28 DIAGNOSIS — K40.20 BILATERAL INGUINAL HERNIA, WITHOUT OBSTRUCTION OR GANGRENE, NOT SPECIFIED AS RECURRENT: Chronic | ICD-10-CM

## 2019-04-28 DIAGNOSIS — Z92.89 PERSONAL HISTORY OF OTHER MEDICAL TREATMENT: Chronic | ICD-10-CM

## 2019-04-28 DIAGNOSIS — Z98.2 PRESENCE OF CEREBROSPINAL FLUID DRAINAGE DEVICE: Chronic | ICD-10-CM

## 2019-04-28 LAB
ALBUMIN SERPL ELPH-MCNC: 4.7 G/DL — SIGNIFICANT CHANGE UP (ref 3.3–5)
ALP SERPL-CCNC: 101 U/L — SIGNIFICANT CHANGE UP (ref 40–120)
ALT FLD-CCNC: 44 U/L — HIGH (ref 4–41)
ANION GAP SERPL CALC-SCNC: 15 MMO/L — HIGH (ref 7–14)
APTT BLD: 34 SEC — SIGNIFICANT CHANGE UP (ref 27.5–36.3)
AST SERPL-CCNC: 18 U/L — SIGNIFICANT CHANGE UP (ref 4–40)
BASOPHILS # BLD AUTO: 0.03 K/UL — SIGNIFICANT CHANGE UP (ref 0–0.2)
BASOPHILS NFR BLD AUTO: 0.8 % — SIGNIFICANT CHANGE UP (ref 0–2)
BILIRUB SERPL-MCNC: 0.3 MG/DL — SIGNIFICANT CHANGE UP (ref 0.2–1.2)
BLD GP AB SCN SERPL QL: NEGATIVE — SIGNIFICANT CHANGE UP
BUN SERPL-MCNC: 17 MG/DL — SIGNIFICANT CHANGE UP (ref 7–23)
CALCIUM SERPL-MCNC: 9.7 MG/DL — SIGNIFICANT CHANGE UP (ref 8.4–10.5)
CHLORIDE SERPL-SCNC: 102 MMOL/L — SIGNIFICANT CHANGE UP (ref 98–107)
CLARITY CSF: CLEAR — SIGNIFICANT CHANGE UP
CO2 SERPL-SCNC: 25 MMOL/L — SIGNIFICANT CHANGE UP (ref 22–31)
COLOR CSF: COLORLESS — SIGNIFICANT CHANGE UP
CREAT SERPL-MCNC: 1.09 MG/DL — SIGNIFICANT CHANGE UP (ref 0.5–1.3)
EOSINOPHIL # BLD AUTO: 0.16 K/UL — SIGNIFICANT CHANGE UP (ref 0–0.5)
EOSINOPHIL NFR BLD AUTO: 4.5 % — SIGNIFICANT CHANGE UP (ref 0–6)
GLUCOSE CSF-MCNC: 66 MG/DL — SIGNIFICANT CHANGE UP (ref 40–70)
GLUCOSE SERPL-MCNC: 102 MG/DL — HIGH (ref 70–99)
GRAM STN CSF: SIGNIFICANT CHANGE UP
GRAM STN CSF: SIGNIFICANT CHANGE UP
HCT VFR BLD CALC: 45.6 % — SIGNIFICANT CHANGE UP (ref 39–50)
HGB BLD-MCNC: 15.1 G/DL — SIGNIFICANT CHANGE UP (ref 13–17)
IMM GRANULOCYTES NFR BLD AUTO: 0.3 % — SIGNIFICANT CHANGE UP (ref 0–1.5)
INR BLD: 0.91 — SIGNIFICANT CHANGE UP (ref 0.88–1.17)
LYMPHOCYTES # BLD AUTO: 1.79 K/UL — SIGNIFICANT CHANGE UP (ref 1–3.3)
LYMPHOCYTES # BLD AUTO: 50.4 % — HIGH (ref 13–44)
LYMPHOCYTES # CSF: 44 % — SIGNIFICANT CHANGE UP
MCHC RBC-ENTMCNC: 29.6 PG — SIGNIFICANT CHANGE UP (ref 27–34)
MCHC RBC-ENTMCNC: 33.1 % — SIGNIFICANT CHANGE UP (ref 32–36)
MCV RBC AUTO: 89.4 FL — SIGNIFICANT CHANGE UP (ref 80–100)
MONOCYTES # BLD AUTO: 0.33 K/UL — SIGNIFICANT CHANGE UP (ref 0–0.9)
MONOCYTES # CSF: 47 % — SIGNIFICANT CHANGE UP
MONOCYTES NFR BLD AUTO: 9.3 % — SIGNIFICANT CHANGE UP (ref 2–14)
NEUTROPHILS # BLD AUTO: 1.23 K/UL — LOW (ref 1.8–7.4)
NEUTROPHILS NFR BLD AUTO: 34.7 % — LOW (ref 43–77)
NEUTS SEG NFR CSF MANUAL: 2 % — SIGNIFICANT CHANGE UP
NRBC # FLD: 0 K/UL — SIGNIFICANT CHANGE UP (ref 0–0)
NRBC NFR CSF: 1 CELL/UL — SIGNIFICANT CHANGE UP (ref 0–5)
OTHER - SPINAL FLUID: 7 % — SIGNIFICANT CHANGE UP
PLATELET # BLD AUTO: 201 K/UL — SIGNIFICANT CHANGE UP (ref 150–400)
PMV BLD: 10.4 FL — SIGNIFICANT CHANGE UP (ref 7–13)
POTASSIUM SERPL-MCNC: 4 MMOL/L — SIGNIFICANT CHANGE UP (ref 3.5–5.3)
POTASSIUM SERPL-SCNC: 4 MMOL/L — SIGNIFICANT CHANGE UP (ref 3.5–5.3)
PROT CSF-MCNC: 21.5 MG/DL — SIGNIFICANT CHANGE UP (ref 15–40)
PROT SERPL-MCNC: 8.3 G/DL — SIGNIFICANT CHANGE UP (ref 6–8.3)
PROTHROM AB SERPL-ACNC: 10.4 SEC — SIGNIFICANT CHANGE UP (ref 9.8–13.1)
RBC # BLD: 5.1 M/UL — SIGNIFICANT CHANGE UP (ref 4.2–5.8)
RBC # CSF: < 1 CELL/UL — HIGH (ref 0–0)
RBC # FLD: 12.4 % — SIGNIFICANT CHANGE UP (ref 10.3–14.5)
RH IG SCN BLD-IMP: POSITIVE — SIGNIFICANT CHANGE UP
SODIUM SERPL-SCNC: 142 MMOL/L — SIGNIFICANT CHANGE UP (ref 135–145)
SPECIMEN SOURCE: SIGNIFICANT CHANGE UP
SPECIMEN SOURCE: SIGNIFICANT CHANGE UP
TOTAL CELLS COUNTED, SPINAL FLUID: 45 CELLS — SIGNIFICANT CHANGE UP
WBC # BLD: 3.55 K/UL — LOW (ref 3.8–10.5)
WBC # FLD AUTO: 3.55 K/UL — LOW (ref 3.8–10.5)
XANTHOCHROMIA: SIGNIFICANT CHANGE UP

## 2019-04-28 PROCEDURE — 70450 CT HEAD/BRAIN W/O DYE: CPT | Mod: 26

## 2019-04-28 PROCEDURE — 93010 ELECTROCARDIOGRAM REPORT: CPT

## 2019-04-28 RX ORDER — VANCOMYCIN HCL 1 G
750 VIAL (EA) INTRAVENOUS EVERY 12 HOURS
Qty: 0 | Refills: 0 | Status: DISCONTINUED | OUTPATIENT
Start: 2019-04-29 | End: 2019-04-30

## 2019-04-28 RX ORDER — LISINOPRIL 2.5 MG/1
10 TABLET ORAL DAILY
Qty: 0 | Refills: 0 | Status: DISCONTINUED | OUTPATIENT
Start: 2019-04-28 | End: 2019-04-29

## 2019-04-28 RX ORDER — CEFEPIME 1 G/1
1000 INJECTION, POWDER, FOR SOLUTION INTRAMUSCULAR; INTRAVENOUS ONCE
Qty: 0 | Refills: 0 | Status: COMPLETED | OUTPATIENT
Start: 2019-04-28 | End: 2019-04-28

## 2019-04-28 RX ORDER — INFLUENZA VIRUS VACCINE 15; 15; 15; 15 UG/.5ML; UG/.5ML; UG/.5ML; UG/.5ML
0.5 SUSPENSION INTRAMUSCULAR ONCE
Qty: 0 | Refills: 0 | Status: COMPLETED | OUTPATIENT
Start: 2019-04-28 | End: 2019-04-28

## 2019-04-28 RX ORDER — VANCOMYCIN HCL 1 G
750 VIAL (EA) INTRAVENOUS ONCE
Qty: 0 | Refills: 0 | Status: COMPLETED | OUTPATIENT
Start: 2019-04-28 | End: 2019-04-28

## 2019-04-28 RX ORDER — CEFEPIME 1 G/1
INJECTION, POWDER, FOR SOLUTION INTRAMUSCULAR; INTRAVENOUS
Qty: 0 | Refills: 0 | Status: DISCONTINUED | OUTPATIENT
Start: 2019-04-28 | End: 2019-04-30

## 2019-04-28 RX ORDER — CEFEPIME 1 G/1
1000 INJECTION, POWDER, FOR SOLUTION INTRAMUSCULAR; INTRAVENOUS EVERY 12 HOURS
Qty: 0 | Refills: 0 | Status: DISCONTINUED | OUTPATIENT
Start: 2019-04-29 | End: 2019-04-30

## 2019-04-28 RX ORDER — VANCOMYCIN HCL 1 G
VIAL (EA) INTRAVENOUS
Qty: 0 | Refills: 0 | Status: DISCONTINUED | OUTPATIENT
Start: 2019-04-28 | End: 2019-04-30

## 2019-04-28 RX ORDER — LOSARTAN POTASSIUM 100 MG/1
1 TABLET, FILM COATED ORAL
Qty: 0 | Refills: 0 | COMMUNITY

## 2019-04-28 RX ORDER — SODIUM CHLORIDE 9 MG/ML
1000 INJECTION INTRAMUSCULAR; INTRAVENOUS; SUBCUTANEOUS
Qty: 0 | Refills: 0 | Status: DISCONTINUED | OUTPATIENT
Start: 2019-04-28 | End: 2019-04-29

## 2019-04-28 RX ORDER — ACETAMINOPHEN 500 MG
650 TABLET ORAL EVERY 6 HOURS
Qty: 0 | Refills: 0 | Status: DISCONTINUED | OUTPATIENT
Start: 2019-04-28 | End: 2019-04-30

## 2019-04-28 RX ADMIN — CEFEPIME 100 MILLIGRAM(S): 1 INJECTION, POWDER, FOR SOLUTION INTRAMUSCULAR; INTRAVENOUS at 16:52

## 2019-04-28 RX ADMIN — Medication 250 MILLIGRAM(S): at 18:02

## 2019-04-28 RX ADMIN — Medication 650 MILLIGRAM(S): at 21:22

## 2019-04-28 RX ADMIN — Medication 650 MILLIGRAM(S): at 21:52

## 2019-04-28 NOTE — H&P ADULT - NSICDXPASTSURGICALHX_GEN_ALL_CORE_FT
PAST SURGICAL HISTORY:  Bilateral inguinal hernia     History of creation of ventriculoperitoneal shunt at couple months old in [place] - left shunt currently not working    S/P  shunt at 11 yrs old at that time patient had stroke

## 2019-04-28 NOTE — ED ADULT NURSE NOTE - PSH
Bilateral inguinal hernia    History of creation of ventriculoperitoneal shunt  at couple months old in [place] - left shunt currently not working  S/P  shunt  at 11 yrs old at that time patient had stroke

## 2019-04-28 NOTE — H&P ADULT - NSHPPHYSICALEXAM_GEN_ALL_CORE
Vital Signs Last 24 Hrs  T(C): 36.8 (28 Apr 2019 13:40), Max: 36.9 (28 Apr 2019 12:26)  T(F): 98.2 (28 Apr 2019 13:40), Max: 98.4 (28 Apr 2019 12:26)  HR: 84 (28 Apr 2019 15:24) (81 - 85)  BP: 154/103 (28 Apr 2019 15:24) (140/84 - 154/103)  BP(mean): --  RR: 18 (28 Apr 2019 13:40) (16 - 18)  SpO2: 100% (28 Apr 2019 13:40) (100% - 100%)    PHYSICAL EXAM:  Awake Alert Attentive Affect appropriate Ox3  PERRL L eye dysgongugate gaze (baseline)  Tone: normal.                  Strength:     Motor 5/5 stength  CSF leaking from right side of head over linear incision where shunt was ligated off.

## 2019-04-28 NOTE — ED PROVIDER NOTE - OBJECTIVE STATEMENT
43M h/o HTN, congenital hydrocephalus, CVA s/p stereotactic endoscopic third ventriculostomy,  Shunt Revision on 03/18/19 presents with leaking from surgical site. Reports since surgery has had some swelling on R scalp that was mildly painful. This morning work up with large amount of clear drainage on pillow, amount of swelling is smaller. No HA, no vision change, no fever.

## 2019-04-28 NOTE — ED ADULT NURSE NOTE - NSIMPLEMENTINTERV_GEN_ALL_ED
Implemented All Universal Safety Interventions:  Liberal to call system. Call bell, personal items and telephone within reach. Instruct patient to call for assistance. Room bathroom lighting operational. Non-slip footwear when patient is off stretcher. Physically safe environment: no spills, clutter or unnecessary equipment. Stretcher in lowest position, wheels locked, appropriate side rails in place.

## 2019-04-28 NOTE — ED PROVIDER NOTE - CLINICAL SUMMARY MEDICAL DECISION MAKING FREE TEXT BOX
43M s/p 43M  stereotactic endoscopic third ventriculostomy,  Shunt Revision presents from leaking from incision, concern for CSF. Plan for labs, CT head, neurosurgery c/s.

## 2019-04-28 NOTE — ED ADULT TRIAGE NOTE - CHIEF COMPLAINT QUOTE
pt s/p endoscopic third ventriculostomy x 1 month ago, presents today c/o leaking from surgical site. states "clear fluid dripping from head". sent to ED by neurologist for eval.  pt denies HA/change in vision/n/v

## 2019-04-28 NOTE — H&P ADULT - ASSESSMENT
43 year old congential HCP with recently ligated  shunt (ligated at the neck) and Endoscopic third ventriculostomy on 3/25/19 with CSF leaking through ligation site incision x 1 day, no headache, CT demonstrated collection with stable ventircular size

## 2019-04-28 NOTE — H&P ADULT - PROBLEM SELECTOR PLAN 1
1. Admit for Repair of ligated shunt vs  shunt revision with either Dr. Larson or Dr. Saldana   2. Preop labs  3. Resevior tapped under sterile technique with 23 gauge butterfly, 35 cc of clear csf drained and sent for analysis, inferior collection along ligation incision tapped and 6cc obtained of clear CSF and sent for analysis.   4. Shunt series  5. Stereo MRI and CSF flow tonight  6. NPO after midnight  7. D.w Dr. Larson

## 2019-04-28 NOTE — H&P ADULT - NSHPLABSRESULTS_GEN_ALL_CORE
Glucose, CSF: 66 mg/dL (04-28 @ 14:45)  Protein, CSF: 21.5 mg/dL (04-28 @ 14:45)                          15.1   3.55  )-----------( 201      ( 28 Apr 2019 14:40 )             45.6     04-28    142  |  102  |  17  ----------------------------<  102<H>  4.0   |  25  |  1.09    Ca    9.7      28 Apr 2019 14:40    TPro  8.3  /  Alb  4.7  /  TBili  0.3  /  DBili  x   /  AST  18  /  ALT  44<H>  /  AlkPhos  101  04-28    PT/INR - ( 28 Apr 2019 14:40 )   PT: 10.4 SEC;   INR: 0.91          PTT - ( 28 Apr 2019 14:40 )  PTT:34.0 SEC      RADIOLOGY & ADDITIONAL STUDIES:    < from: CT Head No Cont (04.28.19 @ 13:25) >    EXAM:  CT BRAIN        PROCEDURE DATE:  Apr 28 2019         INTERPRETATION:  Noncontrast CT of the brain.    CLINICAL INDICATION:  Status post neurosurgery with leakage from wound    TECHNIQUE : Axial CT scanning of the brain was obtained from the skull   base to the vertex without the administration of intravenous contrast.      COMPARISON: A brain CT dated 3/25/2019    FINDINGS:  Redemonstrated is a right frontal approach ventriculostomy   catheter terminating in the midline of the frontal horns. Pericatheter   lucency is unchanged in appearance. Postprocedural pneumocephaly has   resolved A right frontal serenity hole is again noted. There is mild increase   in soft tissue density and fluid associated with the extracranial portion   of the ventricular catheter.     No acute hemorrhage is identified. The ventricular system remains   prominent in appearance and unchanged in size.    A chronic right corona radiata infarct is again noted.    The orbits, paranasal sinuses and tympanomastoid cavities are not   remarkable in appearance.    Impression:    Increasing fluid associated with the extra calvarial component of the   ventricular shunt catheter. Clinical correlation to exclude infection.   Brain MRI may be helpful to evaluate for abscess.    Stable ventricular size. No acute hemorrhage.    < end of copied text >

## 2019-04-28 NOTE — ED ADULT NURSE NOTE - OBJECTIVE STATEMENT
43y M AaOx4 brought in from pcp for leaking thin clear fluid from ventriculostomy site. fluid presents as csf . ct of the head already has been taken. pt denies HA/diplopia/blurry vision/ dizziness. no neuro defecits present on initial assessment. vss as noted. pt stated that surgical site was more inflammed earlier today and site has been decreasing. pt presents well and in NAD

## 2019-04-28 NOTE — H&P ADULT - HISTORY OF PRESENT ILLNESS
43M h/o HTN, congenital hydrocephalus, CVA s/p stereotactic endoscopic third ventriculostomy, Ligation on 3/18/19 by Dr. Saldana,  presents with leaking from surgical site. Reports since surgery has had some swelling on R scalp that was mildly painful. This morning work up with large amount of clear drainage on pillow, amount of swelling is smaller. No HA, no vision change, no fever.    Patient has had shunt since 3 months old with revision performed at 11 years old, patient has swelling at neck earlier this year and Dr. Saldana noted he was candidate for ETV.     Denies fevers chills, no headaches, no neck pain or nuccal rigiditiy        PAST MEDICAL & SURGICAL HISTORY:  Hydrocephalus  Stroke  Essential hypertension  Bilateral inguinal hernia  History of creation of ventriculoperitoneal shunt: at couple months old in [place] - left shunt currently not working  S/P  shunt: at 11 yrs old at that time patient had stroke    Allergies    No Known Allergies    Intolerances        SOCIAL HISTORY:  FAMILY HISTORY:  Family history of hypertension in brother (Sibling)  Family history of hypertension: mother and father

## 2019-04-28 NOTE — H&P ADULT - NSICDXFAMILYHX_GEN_ALL_CORE_FT
FAMILY HISTORY:  Family history of hypertension, mother and father    Sibling  Still living? Yes, Estimated age: Age Unknown  Family history of hypertension in brother, Age at diagnosis: Age Unknown

## 2019-04-28 NOTE — ED PROVIDER NOTE - PSH
Bilateral inguinal hernia    History of creation of ventriculoperitoneal shunt  at couple months old in [place] - left shunt currently not working  S/P  shunt  at 11 yrs old at that time patient had stroke

## 2019-04-28 NOTE — ED ADULT NURSE REASSESSMENT NOTE - NS ED NURSE REASSESS COMMENT FT1
pt stated that he just took his at home medications. Enalapril 40mg 1x day. pt stated he took the medication without drinking any water.

## 2019-04-29 DIAGNOSIS — Z01.818 ENCOUNTER FOR OTHER PREPROCEDURAL EXAMINATION: ICD-10-CM

## 2019-04-29 DIAGNOSIS — G96.0 CEREBROSPINAL FLUID LEAK: ICD-10-CM

## 2019-04-29 DIAGNOSIS — I10 ESSENTIAL (PRIMARY) HYPERTENSION: ICD-10-CM

## 2019-04-29 DIAGNOSIS — Z29.9 ENCOUNTER FOR PROPHYLACTIC MEASURES, UNSPECIFIED: ICD-10-CM

## 2019-04-29 DIAGNOSIS — I63.9 CEREBRAL INFARCTION, UNSPECIFIED: ICD-10-CM

## 2019-04-29 DIAGNOSIS — G91.9 HYDROCEPHALUS, UNSPECIFIED: ICD-10-CM

## 2019-04-29 LAB
CLARITY CSF: SIGNIFICANT CHANGE UP
COLOR CSF: SIGNIFICANT CHANGE UP
GLUCOSE CSF-MCNC: 64 MG/DL — SIGNIFICANT CHANGE UP (ref 40–70)
GRAM STN CSF: SIGNIFICANT CHANGE UP
LYMPHOCYTES # CSF: 75 % — SIGNIFICANT CHANGE UP
MONOCYTES # CSF: 13 % — SIGNIFICANT CHANGE UP
NEUTS SEG NFR CSF MANUAL: 13 % — SIGNIFICANT CHANGE UP
NRBC NFR CSF: 1 CELL/UL — SIGNIFICANT CHANGE UP (ref 0–5)
PROT CSF-MCNC: 20.5 MG/DL — SIGNIFICANT CHANGE UP (ref 15–40)
RBC # CSF: 1764 CELL/UL — HIGH (ref 0–0)
SPECIMEN SOURCE: SIGNIFICANT CHANGE UP
TOTAL CELLS COUNTED, SPINAL FLUID: 8 CELLS — SIGNIFICANT CHANGE UP
XANTHOCHROMIA: SIGNIFICANT CHANGE UP

## 2019-04-29 PROCEDURE — 70250 X-RAY EXAM OF SKULL: CPT | Mod: 26

## 2019-04-29 PROCEDURE — 99223 1ST HOSP IP/OBS HIGH 75: CPT

## 2019-04-29 PROCEDURE — 70551 MRI BRAIN STEM W/O DYE: CPT | Mod: 26

## 2019-04-29 PROCEDURE — 71046 X-RAY EXAM CHEST 2 VIEWS: CPT | Mod: 26

## 2019-04-29 PROCEDURE — 74019 RADEX ABDOMEN 2 VIEWS: CPT | Mod: 26

## 2019-04-29 RX ORDER — LISINOPRIL 2.5 MG/1
10 TABLET ORAL DAILY
Qty: 0 | Refills: 0 | Status: DISCONTINUED | OUTPATIENT
Start: 2019-04-29 | End: 2019-04-30

## 2019-04-29 RX ORDER — CEFAZOLIN SODIUM 1 G
2000 VIAL (EA) INJECTION EVERY 8 HOURS
Qty: 0 | Refills: 0 | Status: DISCONTINUED | OUTPATIENT
Start: 2019-04-29 | End: 2019-04-29

## 2019-04-29 RX ORDER — HYDROMORPHONE HYDROCHLORIDE 2 MG/ML
0.5 INJECTION INTRAMUSCULAR; INTRAVENOUS; SUBCUTANEOUS
Qty: 0 | Refills: 0 | Status: DISCONTINUED | OUTPATIENT
Start: 2019-04-29 | End: 2019-04-29

## 2019-04-29 RX ORDER — OXYCODONE HYDROCHLORIDE 5 MG/1
5 TABLET ORAL EVERY 4 HOURS
Qty: 0 | Refills: 0 | Status: DISCONTINUED | OUTPATIENT
Start: 2019-04-29 | End: 2019-04-30

## 2019-04-29 RX ORDER — OXYCODONE HYDROCHLORIDE 5 MG/1
10 TABLET ORAL EVERY 4 HOURS
Qty: 0 | Refills: 0 | Status: DISCONTINUED | OUTPATIENT
Start: 2019-04-29 | End: 2019-04-30

## 2019-04-29 RX ADMIN — HYDROMORPHONE HYDROCHLORIDE 0.5 MILLIGRAM(S): 2 INJECTION INTRAMUSCULAR; INTRAVENOUS; SUBCUTANEOUS at 16:45

## 2019-04-29 RX ADMIN — CEFEPIME 100 MILLIGRAM(S): 1 INJECTION, POWDER, FOR SOLUTION INTRAMUSCULAR; INTRAVENOUS at 18:58

## 2019-04-29 RX ADMIN — Medication 250 MILLIGRAM(S): at 20:02

## 2019-04-29 RX ADMIN — Medication 250 MILLIGRAM(S): at 06:00

## 2019-04-29 RX ADMIN — LISINOPRIL 10 MILLIGRAM(S): 2.5 TABLET ORAL at 05:23

## 2019-04-29 RX ADMIN — HYDROMORPHONE HYDROCHLORIDE 0.5 MILLIGRAM(S): 2 INJECTION INTRAMUSCULAR; INTRAVENOUS; SUBCUTANEOUS at 17:00

## 2019-04-29 RX ADMIN — OXYCODONE HYDROCHLORIDE 5 MILLIGRAM(S): 5 TABLET ORAL at 18:36

## 2019-04-29 RX ADMIN — HYDROMORPHONE HYDROCHLORIDE 0.5 MILLIGRAM(S): 2 INJECTION INTRAMUSCULAR; INTRAVENOUS; SUBCUTANEOUS at 18:00

## 2019-04-29 RX ADMIN — SODIUM CHLORIDE 75 MILLILITER(S): 9 INJECTION INTRAMUSCULAR; INTRAVENOUS; SUBCUTANEOUS at 00:03

## 2019-04-29 RX ADMIN — Medication 650 MILLIGRAM(S): at 20:15

## 2019-04-29 RX ADMIN — Medication 650 MILLIGRAM(S): at 20:55

## 2019-04-29 RX ADMIN — CEFEPIME 100 MILLIGRAM(S): 1 INJECTION, POWDER, FOR SOLUTION INTRAMUSCULAR; INTRAVENOUS at 05:23

## 2019-04-29 NOTE — BRIEF OPERATIVE NOTE - NSICDXBRIEFPROCEDURE_GEN_ALL_CORE_FT
PROCEDURES:  Revision, ventriculoperitoneal shunt component, peritoneal catheter 29-Apr-2019 16:13:35  Ludy Chen

## 2019-04-29 NOTE — CONSULT NOTE ADULT - PROBLEM SELECTOR RECOMMENDATION 9
Management as per Neurosurgery  -Awaiting repeat MRI and shuntogram results, plan for OR today for washout as per neurosurgery  -Neurochecks  -Agree with vancomycin and cefepime for now for meningitis ppx given concerns for CSF leakage  -Check Vancomycin trough pre-4th dose  -f/u CSF cultures, currently NTD

## 2019-04-29 NOTE — CONSULT NOTE ADULT - SUBJECTIVE AND OBJECTIVE BOX
CHIEF COMPLAINT: Patient is a 43y old  Male who presents with a chief complaint of CSF leaking from wound (29 Apr 2019 01:04)      HPI: HPI:  43M h/o HTN, congenital hydrocephalus, CVA s/p stereotactic endoscopic third ventriculostomy, Ligation on 3/18/19 by Dr. Saldana,  presents with leaking from surgical site. Reports since surgery has had some swelling on R scalp that was mildly painful. This morning work up with large amount of clear drainage on pillow, amount of swelling is smaller. No HA, no vision change, no fever.    Patient has had shunt since 3 months old with revision performed at 11 years old, patient has swelling at neck earlier this year and Dr. Saldana noted he was candidate for ETV.     Denies fevers chills, no headaches, no neck pain or nuccal rigiditiy  ________________________________  Patient seen and examined this morning pre-operatively. Mother at bedside. Per the patient, at baseline is able to climb up a flight of steps without any CP, SOB, palpitations, dizziness. He recently underwent a endoscopic third ventriculostomy with shunt ligation in 3/19 without issue. At that time, the procedure was performed under general anesthesia and the patient did not experience any complications with anesthesia. The patient has had multiple prior surgeries with no adverse anesthesia reactions. Of note, patient did suffer a CVA perioperatively when he was 11 years old during a  shunt repair procedure. At present, he denies any CP, SOB, palpitations, dizziness, LE edema. His mother reports the patient was recently diagnosed with hypertension as an outpatient. His PCP prescribed him a new medication, but he did not take it because there was no generic and it was too expensive. She has instead been giving him her own enalapril 10mg po Qdaily for BP control with good effect. Patient denies fevers, chills, HA, neck pain, nuchal rigidity. He reports he just "wants to get the procedure over with."         PAST MEDICAL & SURGICAL HISTORY:  Hydrocephalus  Stroke Perioperatively when 11 years old  Essential hypertension  Bilateral inguinal hernia  History of creation of ventriculoperitoneal shunt: at couple months old in [place] - left shunt currently not working  S/P  shunt: at 11 yrs old at that time patient had stroke    Allergies    No Known Allergies    Intolerances        SOCIAL HISTORY: Denies any smoking hx, occasionally will have two shots of liquor (last in 3/19 prior to last surgery), no IVDU. Lives with mother in separate basement apartment. Works for medical records at [employer]    FAMILY HISTORY:  Family history of hypertension in brother (Sibling)  Family history of hypertension: mother and father (28 Apr 2019 15:33)      Allergies    No Known Allergies    Intolerances        HOME MEDICATIONS: [x] Reviewed  -Aspirin 81mg po Qdaily  -Enalapril 10mg po Qdaily    MEDICATIONS  (STANDING):  cefepime   IVPB      cefepime   IVPB 1000 milliGRAM(s) IV Intermittent every 12 hours  influenza   Vaccine 0.5 milliLiter(s) IntraMuscular once  lisinopril 10 milliGRAM(s) Oral daily  sodium chloride 0.9%. 1000 milliLiter(s) (75 mL/Hr) IV Continuous <Continuous>  vancomycin  IVPB      vancomycin  IVPB 750 milliGRAM(s) IV Intermittent every 12 hours    MEDICATIONS  (PRN):  acetaminophen   Tablet .. 650 milliGRAM(s) Oral every 6 hours PRN Mild Pain (1 - 3)      PAST MEDICAL & SURGICAL HISTORY:  Hydrocephalus  Stroke  Essential hypertension  Bilateral inguinal hernia  History of creation of ventriculoperitoneal shunt: at couple months old in [place] - left shunt currently not working  S/P  shunt: at 11 yrs old at that time patient had stroke  [x] Reviewed     SOCIAL HISTORY:  [x] Substance abuse: Denies  [x] Tobacco: Denies  [x] Alcohol use: Occasionally has up to two shots of liquor, last in 3/19 prior to last procedure    FAMILY HISTORY:  Family history of hypertension in brother (Sibling)  Family history of hypertension: mother and father  [x] No pertinent family history in first degree relatives     REVIEW OF SYSTEMS:    [x] All other ROS negative  [  ] Unable to obtain due to poor mental status    Vital Signs Last 24 Hrs  T(C): 37 (29 Apr 2019 13:02), Max: 37 (28 Apr 2019 21:20)  T(F): 98.6 (29 Apr 2019 13:02), Max: 98.6 (28 Apr 2019 21:20)  HR: 89 (29 Apr 2019 13:02) (78 - 99)  BP: 157/100 (29 Apr 2019 13:02) (132/83 - 157/100)  BP(mean): --  RR: 17 (29 Apr 2019 13:02) (17 - 18)  SpO2: 97% (29 Apr 2019 13:02) (95% - 100%)    PHYSICAL EXAM:    GENERAL: NAD, well-groomed, well-developed  HEAD:  Atraumatic, Normocephalic  EYES: EOMI, PERRLA, conjunctiva and sclera clear  ENMT: Moist mucous membranes  NECK: Supple, No JVD  RESPIRATORY: Clear to auscultation bilaterally; No rales, rhonchi, wheezing, or rubs  CARDIOVASCULAR: Regular rate and rhythm; No murmurs, rubs, or gallops  GASTROINTESTINAL: Soft, Nontender, Nondistended; Bowel sounds present  GENITOURINARY: Not examined  EXTREMITIES:  2+ Peripheral Pulses, No clubbing, cyanosis, or edema  NERVOUS SYSTEM:  Alert & Oriented X3; Moving all 4 extremities; No gross sensory deficits  HEME/LYMPH: No lymphadenopathy noted  SKIN: No rashes or lesions; Incisions C/D/I    LABS:                        15.1   3.55  )-----------( 201      ( 28 Apr 2019 14:40 )             45.6     04-28    142  |  102  |  17  ----------------------------<  102<H>  4.0   |  25  |  1.09    Ca    9.7      28 Apr 2019 14:40    TPro  8.3  /  Alb  4.7  /  TBili  0.3  /  DBili  x   /  AST  18  /  ALT  44<H>  /  AlkPhos  101  04-28    PT/INR - ( 28 Apr 2019 14:40 )   PT: 10.4 SEC;   INR: 0.91          PTT - ( 28 Apr 2019 14:40 )  PTT:34.0 SEC    CAPILLARY BLOOD GLUCOSE          RADIOLOGY & ADDITIONAL STUDIES:    EKG:   Personally Reviewed:  [x] YES     Imaging:   Personally Reviewed:  [x] YES               [ ] Consultant(s) Notes Reviewed  [x] Care Discussed with Consultants/Other Providers: Urology PA - discussed CHIEF COMPLAINT: Patient is a 43y old  Male who presents with a chief complaint of CSF leaking from wound (29 Apr 2019 01:04)      HPI: HPI:  43M h/o HTN, congenital hydrocephalus, CVA s/p stereotactic endoscopic third ventriculostomy, Ligation on 3/18/19 by Dr. Saldana,  presents with leaking from surgical site. Reports since surgery has had some swelling on R scalp that was mildly painful. This morning work up with large amount of clear drainage on pillow, amount of swelling is smaller. No HA, no vision change, no fever.    Patient has had shunt since 3 months old with revision performed at 11 years old, patient has swelling at neck earlier this year and Dr. Saldana noted he was candidate for ETV.     Denies fevers chills, no headaches, no neck pain or nuccal rigiditiy  ________________________________  Patient seen and examined this morning pre-operatively. Mother at bedside. Per the patient, at baseline is able to climb up a flight of steps without any CP, SOB, palpitations, dizziness. He recently underwent a endoscopic third ventriculostomy with shunt ligation in 3/19 without issue. At that time, the procedure was performed under general anesthesia and the patient did not experience any complications with anesthesia. The patient has had multiple prior surgeries with no adverse anesthesia reactions. Of note, patient did suffer a CVA perioperatively when he was 11 years old during a  shunt repair procedure. At present, he denies any CP, SOB, palpitations, dizziness, LE edema. His mother reports the patient was recently diagnosed with hypertension as an outpatient. His PCP prescribed him a new medication, but he did not take it because there was no generic and it was too expensive. She has instead been giving him her own enalapril 10mg po Qdaily for BP control with good effect. Patient denies fevers, chills, HA, neck pain, nuchal rigidity. He reports he just "wants to get the procedure over with."         PAST MEDICAL & SURGICAL HISTORY:  Hydrocephalus  Stroke Perioperatively when 11 years old  Essential hypertension  Bilateral inguinal hernia  History of creation of ventriculoperitoneal shunt: at couple months old in [place] - left shunt currently not working  S/P  shunt: at 11 yrs old at that time patient had stroke    Allergies    No Known Allergies    Intolerances        SOCIAL HISTORY: Denies any smoking hx, occasionally will have two shots of liquor (last in 3/19 prior to last surgery), no IVDU. Lives with mother in separate basement apartment. Works for medical records at [employer]    FAMILY HISTORY:  Family history of hypertension in brother (Sibling)  Family history of hypertension: mother and father (28 Apr 2019 15:33)      Allergies    No Known Allergies    Intolerances        HOME MEDICATIONS: [x] Reviewed  -Aspirin 81mg po Qdaily  -Enalapril 10mg po Qdaily    MEDICATIONS  (STANDING):  cefepime   IVPB      cefepime   IVPB 1000 milliGRAM(s) IV Intermittent every 12 hours  influenza   Vaccine 0.5 milliLiter(s) IntraMuscular once  lisinopril 10 milliGRAM(s) Oral daily  sodium chloride 0.9%. 1000 milliLiter(s) (75 mL/Hr) IV Continuous <Continuous>  vancomycin  IVPB      vancomycin  IVPB 750 milliGRAM(s) IV Intermittent every 12 hours    MEDICATIONS  (PRN):  acetaminophen   Tablet .. 650 milliGRAM(s) Oral every 6 hours PRN Mild Pain (1 - 3)      PAST MEDICAL & SURGICAL HISTORY:  Hydrocephalus  Stroke  Essential hypertension  Bilateral inguinal hernia  History of creation of ventriculoperitoneal shunt: at couple months old in [place] - left shunt currently not working  S/P  shunt: at 11 yrs old at that time patient had stroke  [x] Reviewed     SOCIAL HISTORY:  [x] Substance abuse: Denies  [x] Tobacco: Denies  [x] Alcohol use: Occasionally has up to two shots of liquor, last in 3/19 prior to last procedure    FAMILY HISTORY:  Family history of hypertension in brother (Sibling)  Family history of hypertension: mother and father  [x] No pertinent family history in first degree relatives     REVIEW OF SYSTEMS:    [x] All other ROS negative  [  ] Unable to obtain due to poor mental status    Vital Signs Last 24 Hrs  T(C): 37 (29 Apr 2019 13:02), Max: 37 (28 Apr 2019 21:20)  T(F): 98.6 (29 Apr 2019 13:02), Max: 98.6 (28 Apr 2019 21:20)  HR: 89 (29 Apr 2019 13:02) (78 - 99)  BP: 157/100 (29 Apr 2019 13:02) (132/83 - 157/100)  BP(mean): --  RR: 17 (29 Apr 2019 13:02) (17 - 18)  SpO2: 97% (29 Apr 2019 13:02) (95% - 100%)    PHYSICAL EXAM:    GENERAL: NAD, well-groomed, well-developed  HEAD:  Atraumatic, Normocephalic  EYES: left eye with dysconjugate gaze, PERRLA, conjunctiva and sclera clear  ENMT: Moist mucous membranes  NECK: Supple, No JVD  RESPIRATORY: Clear to auscultation bilaterally; No rales, rhonchi, wheezing,  CARDIOVASCULAR: Regular rate and rhythm; No murmurs, rubs,  GASTROINTESTINAL: Soft, Nontender, Nondistended; Bowel sounds present  GENITOURINARY: Not examined  EXTREMITIES:  2+ Peripheral Pulses, No clubbing, cyanosis, or edema  NERVOUS SYSTEM:  Alert & Oriented X3; Moving all 4 extremities; No gross sensory deficits  HEME/LYMPH: No lymphadenopathy noted  SKIN: No rashes or lesions; Incisions C/D/I around head    LABS:                        15.1   3.55  )-----------( 201      ( 28 Apr 2019 14:40 )             45.6     04-28    142  |  102  |  17  ----------------------------<  102<H>  4.0   |  25  |  1.09    Ca    9.7      28 Apr 2019 14:40    TPro  8.3  /  Alb  4.7  /  TBili  0.3  /  DBili  x   /  AST  18  /  ALT  44<H>  /  AlkPhos  101  04-28    PT/INR - ( 28 Apr 2019 14:40 )   PT: 10.4 SEC;   INR: 0.91          PTT - ( 28 Apr 2019 14:40 )  PTT:34.0 SEC    Culture - CSF with Gram Stain . (04.28.19 @ 16:18)    Gram Stain Spinal Fluid:   NOS^No Organisms Seen  WBC^White Blood Cells  QNTY CELLS IN GRAM STAIN: RARE (1+)    Culture - CSF:   CULTURE IN PROGRESS, FURTHER REPORT TO FOLLOW.    Specimen Source: CEREBRAL SPINAL FLUID      CAPILLARY BLOOD GLUCOSE          RADIOLOGY & ADDITIONAL STUDIES:    EKG: Personally reviewed from 3/19, NSR 82bpm, no ST changes or q-waves  Personally Reviewed:  [x] YES     Imaging:   Personally Reviewed:  [x] YES  < from: MR Head w/ CSF Flow, No Cont (03.26.19 @ 23:22) >  IMPRESSION:  Slight interval decreased ventriculomegaly.  No acute or subacute infarction.    < end of copied text >                 [ ] Consultant(s) Notes Reviewed  [x] Care Discussed with Consultants/Other Providers: Neurosurgery PA - discussed

## 2019-04-29 NOTE — CONSULT NOTE ADULT - PROBLEM SELECTOR RECOMMENDATION 4
-SCDs given patient to go to OR today RCRI score is 1, low Conway score, no medical contraindication to proceed to OR, patient is currently medically optimized

## 2019-04-29 NOTE — PROVIDER CONTACT NOTE (OTHER) - SITUATION
For Pt Akshat Solis Rm [id number].   Pt adm for SSI drainage  Pt scheduled for OR today and take 81mg Aspirin- last dose was on Saturday

## 2019-04-29 NOTE — PROGRESS NOTE ADULT - SUBJECTIVE AND OBJECTIVE BOX
No issues overnight  Vital Signs Last 24 Hrs  T(C): 37 (28 Apr 2019 21:20), Max: 37 (28 Apr 2019 21:20)  T(F): 98.6 (28 Apr 2019 21:20), Max: 98.6 (28 Apr 2019 21:20)  HR: 99 (28 Apr 2019 21:20) (81 - 99)  BP: 141/87 (28 Apr 2019 21:20) (140/84 - 154/103)  BP(mean): --  RR: 18 (28 Apr 2019 21:20) (16 - 18)  SpO2: 97% (28 Apr 2019 21:20) (97% - 100%)    AAO X 3  PERRLA with left eye dysconjugate gaze  CN 2-12 otherwise grossly intact  SCHRADER strength 5/5  SILT    MEDICATIONS  (STANDING):  cefepime   IVPB      cefepime   IVPB 1000 milliGRAM(s) IV Intermittent every 12 hours  influenza   Vaccine 0.5 milliLiter(s) IntraMuscular once  lisinopril 10 milliGRAM(s) Oral daily  sodium chloride 0.9%. 1000 milliLiter(s) (75 mL/Hr) IV Continuous <Continuous>  vancomycin  IVPB      vancomycin  IVPB 750 milliGRAM(s) IV Intermittent every 12 hours    MEDICATIONS  (PRN):  acetaminophen   Tablet .. 650 milliGRAM(s) Oral every 6 hours PRN Mild Pain (1 - 3)                          15.1   3.55  )-----------( 201      ( 28 Apr 2019 14:40 )             45.6     04-28    142  |  102  |  17  ----------------------------<  102<H>  4.0   |  25  |  1.09    Ca    9.7      28 Apr 2019 14:40    TPro  8.3  /  Alb  4.7  /  TBili  0.3  /  DBili  x   /  AST  18  /  ALT  44<H>  /  AlkPhos  101  04-28    PT/INR - ( 28 Apr 2019 14:40 )   PT: 10.4 SEC;   INR: 0.91          PTT - ( 28 Apr 2019 14:40 )  PTT:34.0 SEC    Type + Screen (04.28.19 @ 14:01)    ABO Interpretation: O    Rh Interpretation: Positive    Antibody Screen: Negative    Culture - CSF with Gram Stain . (04.28.19 @ 16:18)    Gram Stain Spinal Fluid:   NOS^No Organisms Seen  WBC^White Blood Cells  QNTY CELLS IN GRAM STAIN: RARE (1+)    Specimen Source: CEREBRAL SPINAL FLUID    Spinal Fluid Differential (04.28.19 @ 14:45)    Seg Count, CSF: 2 %    Lymphocyte Count, CSF: 44 %    Protein, CSF (04.28.19 @ 14:45)    Protein, CSF: 21.5 mg/dL    Glucose, CSF: 34 mg/dL (03.25.19 @ 15:45)

## 2019-04-29 NOTE — PROGRESS NOTE ADULT - ASSESSMENT
43 year old congential HCP with recently ligated  shunt (ligated at the neck) and Endoscopic third ventriculostomy on 3/25/19 with CSF leaking through ligation site incision x 1 day, no headache, CT demonstrated collection with stable ventricular size

## 2019-04-29 NOTE — CONSULT NOTE ADULT - ASSESSMENT
44 yo M with HTN, congenital hydrocephalus c/b CVA as a child, s/p stereotactic endoscopic third ventriculostomy and ligation on 3/18/19 by Dr. Kang presents with CSF leak from surgical site.

## 2019-04-29 NOTE — PROGRESS NOTE ADULT - SUBJECTIVE AND OBJECTIVE BOX
Neurosurgery postop  patient c/o incisional pain  Vital Signs Last 24 Hrs  T(C): 36.6 (29 Apr 2019 20:03), Max: 37 (28 Apr 2019 21:20)  T(F): 97.8 (29 Apr 2019 20:03), Max: 98.6 (28 Apr 2019 21:20)  HR: 92 (29 Apr 2019 20:03) (77 - 99)  BP: 153/97 (29 Apr 2019 20:03) (132/83 - 158/89)  BP(mean): 99 (29 Apr 2019 18:30) (97 - 111)  RR: 15 (29 Apr 2019 20:03) (10 - 27)  SpO2: 100% (29 Apr 2019 20:03) (92% - 100%)    AAO X 3  PERRLA with left eye dysconjugate gaze  CN 2-12 otherwise grossly intact  SCHRADER strength 5/5 with left hand contracture  SILT    Dressing C/D/I    MEDICATIONS  (STANDING):  cefepime   IVPB 1000 milliGRAM(s) IV Intermittent every 12 hours  cefepime   IVPB      influenza   Vaccine 0.5 milliLiter(s) IntraMuscular once  lisinopril 10 milliGRAM(s) Oral daily  sodium chloride 0.9%. 1000 milliLiter(s) (75 mL/Hr) IV Continuous <Continuous>  vancomycin  IVPB      vancomycin  IVPB 750 milliGRAM(s) IV Intermittent every 12 hours    MEDICATIONS  (PRN):  acetaminophen   Tablet .. 650 milliGRAM(s) Oral every 6 hours PRN Mild Pain (1 - 3)  oxyCODONE    IR 5 milliGRAM(s) Oral every 4 hours PRN Moderate Pain (4 - 6)  oxyCODONE    IR 10 milliGRAM(s) Oral every 4 hours PRN Severe Pain (7 - 10)

## 2019-04-29 NOTE — CONSULT NOTE ADULT - PROBLEM SELECTOR RECOMMENDATION 2
BP above goal at present but going to OR  -Will monitor BP's perioperatively, if remains elevated can consider increasing lisinopril to 15mg po Qdaily  -Patient takes mother's enalapril at home (not lisinopril), but if BP can be controlled on lisinopril, would dc home on this agent given it would be a once daily medication

## 2019-04-30 ENCOUNTER — TRANSCRIPTION ENCOUNTER (OUTPATIENT)
Age: 44
End: 2019-04-30

## 2019-04-30 VITALS
RESPIRATION RATE: 18 BRPM | TEMPERATURE: 98 F | OXYGEN SATURATION: 96 % | DIASTOLIC BLOOD PRESSURE: 83 MMHG | SYSTOLIC BLOOD PRESSURE: 139 MMHG | HEART RATE: 90 BPM

## 2019-04-30 DIAGNOSIS — Z71.89 OTHER SPECIFIED COUNSELING: ICD-10-CM

## 2019-04-30 LAB — VANCOMYCIN TROUGH SERPL-MCNC: 5.8 UG/ML — LOW (ref 10–20)

## 2019-04-30 PROCEDURE — 99232 SBSQ HOSP IP/OBS MODERATE 35: CPT

## 2019-04-30 RX ORDER — ACETAMINOPHEN 500 MG
2 TABLET ORAL
Qty: 0 | Refills: 0 | DISCHARGE
Start: 2019-04-30

## 2019-04-30 RX ORDER — OXYCODONE HYDROCHLORIDE 5 MG/1
1 TABLET ORAL
Qty: 18 | Refills: 0
Start: 2019-04-30 | End: 2019-05-02

## 2019-04-30 RX ADMIN — OXYCODONE HYDROCHLORIDE 5 MILLIGRAM(S): 5 TABLET ORAL at 01:31

## 2019-04-30 RX ADMIN — Medication 650 MILLIGRAM(S): at 05:16

## 2019-04-30 RX ADMIN — LISINOPRIL 10 MILLIGRAM(S): 2.5 TABLET ORAL at 05:16

## 2019-04-30 RX ADMIN — Medication 650 MILLIGRAM(S): at 12:33

## 2019-04-30 RX ADMIN — OXYCODONE HYDROCHLORIDE 5 MILLIGRAM(S): 5 TABLET ORAL at 02:00

## 2019-04-30 RX ADMIN — Medication 650 MILLIGRAM(S): at 06:00

## 2019-04-30 RX ADMIN — Medication 250 MILLIGRAM(S): at 09:14

## 2019-04-30 RX ADMIN — CEFEPIME 100 MILLIGRAM(S): 1 INJECTION, POWDER, FOR SOLUTION INTRAMUSCULAR; INTRAVENOUS at 05:17

## 2019-04-30 NOTE — PROGRESS NOTE ADULT - PROBLEM SELECTOR PLAN 1
Monitor for recurrent leak  Discharge planning
Monitor for recurrent leak  Discharge planning
Preop for wound exploration and revision  Follow up cultures  MRI and shunt series ordered, follow up
Management as per Neurosurgery, currently POD s/p shunt ligation  -Cultures NTD, plan to dc abx per neurosurgery  -Neurochecks  -dc planning for today per NSx

## 2019-04-30 NOTE — PROGRESS NOTE ADULT - SUBJECTIVE AND OBJECTIVE BOX
No issues overnight  Vital Signs Last 24 Hrs  T(C): 36.7 (29 Apr 2019 22:50), Max: 37 (29 Apr 2019 13:02)  T(F): 98.1 (29 Apr 2019 22:50), Max: 98.6 (29 Apr 2019 13:02)  HR: 88 (29 Apr 2019 22:50) (77 - 97)  BP: 148/91 (29 Apr 2019 22:50) (132/83 - 158/89)  BP(mean): 99 (29 Apr 2019 18:30) (97 - 111)  RR: 18 (29 Apr 2019 22:50) (10 - 27)  SpO2: 100% (29 Apr 2019 22:50) (92% - 100%)    AAO X 3  PERRLA with left eye dysconjugate gaze  CN 2-12 otherwise grossly intact  SCHRADER strength 5/5 with left hand contracture  SILT    MEDICATIONS  (STANDING):  cefepime   IVPB 1000 milliGRAM(s) IV Intermittent every 12 hours  cefepime   IVPB      influenza   Vaccine 0.5 milliLiter(s) IntraMuscular once  lisinopril 10 milliGRAM(s) Oral daily  vancomycin  IVPB      vancomycin  IVPB 750 milliGRAM(s) IV Intermittent every 12 hours    MEDICATIONS  (PRN):  acetaminophen   Tablet .. 650 milliGRAM(s) Oral every 6 hours PRN Mild Pain (1 - 3)  oxyCODONE    IR 5 milliGRAM(s) Oral every 4 hours PRN Moderate Pain (4 - 6)  oxyCODONE    IR 10 milliGRAM(s) Oral every 4 hours PRN Severe Pain (7 - 10)

## 2019-04-30 NOTE — DISCHARGE NOTE PROVIDER - HOSPITAL COURSE
HPI:    43M h/o HTN, congenital hydrocephalus, CVA s/p stereotactic endoscopic third ventriculostomy, Ligation on 3/18/19 by Dr. Saldana,  presents with leaking from surgical site. Reports since surgery has had some swelling on R scalp that was mildly painful. This morning work up with large amount of clear drainage on pillow, amount of swelling is smaller. No HA, no vision change, no fever.        Patient has had shunt since 3 months old with revision performed at 11 years old, patient has swelling at neck earlier this year and Dr. Saldana noted he was candidate for ETV.         Denies fevers chills, no headaches, no neck pain or nuccal rigiditiy        CT head showed collection on exterior scalp, stable ventricles    Valve tapped and 35 cc removed followed by tapping of collection at right scalp adjacent to ligated shunt incision. 6cc remvoed        Patient taken for exploration on 4/29 and previous ligation intact however tubing attached to valve detached therefore source of CSF leak identified and closed.

## 2019-04-30 NOTE — PROGRESS NOTE ADULT - SUBJECTIVE AND OBJECTIVE BOX
[facility]  Pager: [phone]    Patient is a 43y old  Male who presents with a chief complaint of CSF leaking from wound (30 Apr 2019 09:26)      SUBJECTIVE / OVERNIGHT EVENTS: No acute events overnight. Excited about discharge home later today. Having some dizziness, but per fiance at bedside, this has occurred since prior to surgery and has been improving. No CP, SOB, palpitations, nausea, vomiting, visual changes. Urinating well, had BM yesterday.    MEDICATIONS  (STANDING):  cefepime   IVPB 1000 milliGRAM(s) IV Intermittent every 12 hours  cefepime   IVPB      influenza   Vaccine 0.5 milliLiter(s) IntraMuscular once  lisinopril 10 milliGRAM(s) Oral daily  vancomycin  IVPB      vancomycin  IVPB 750 milliGRAM(s) IV Intermittent every 12 hours    MEDICATIONS  (PRN):  acetaminophen   Tablet .. 650 milliGRAM(s) Oral every 6 hours PRN Mild Pain (1 - 3)  oxyCODONE    IR 5 milliGRAM(s) Oral every 4 hours PRN Moderate Pain (4 - 6)  oxyCODONE    IR 10 milliGRAM(s) Oral every 4 hours PRN Severe Pain (7 - 10)      Vital Signs Last 24 Hrs  T(C): 36.7 (04-30-19 @ 11:39)  T(F): 98 (04-30-19 @ 11:39), Max: 98.6 (04-29-19 @ 13:02)  HR: 90 (04-30-19 @ 11:39) (77 - 100)  BP: 139/83 (04-30-19 @ 11:39)  BP(mean): 99 (04-29-19 @ 18:30) (97 - 111)  RR: 18 (04-30-19 @ 11:39) (10 - 27)  SpO2: 96% (04-30-19 @ 11:39) (92% - 100%)  Wt(kg): --    04-29 @ 07:01  -  04-30 @ 07:00  --------------------------------------------------------  IN: 665 mL / OUT: 800 mL / NET: -135 mL      CAPILLARY BLOOD GLUCOSE        I&O's Summary    29 Apr 2019 07:01  -  30 Apr 2019 07:00  --------------------------------------------------------  IN: 665 mL / OUT: 800 mL / NET: -135 mL        PHYSICAL EXAM:  GENERAL: NAD, well-developed  HEAD:  Atraumatic, Normocephalic  EYES: EOMI, PERRLA, conjunctiva and sclera clear  NECK: Supple, No JVD  CHEST/LUNG: Clear to auscultation bilaterally; No wheeze  HEART: Regular rate and rhythm; No murmurs, rubs, or gallops  ABDOMEN: Soft, Nontender, Nondistended; Bowel sounds present  EXTREMITIES:  2+ Peripheral Pulses, No clubbing, cyanosis, or edema  PSYCH: AAOx3  NEUROLOGY: non-focal  SKIN: No rashes or lesions    LABS:                        15.1   3.55  )-----------( 201      ( 28 Apr 2019 14:40 )             45.6     04-28    142  |  102  |  17  ----------------------------<  102<H>  4.0   |  25  |  1.09    Ca    9.7      28 Apr 2019 14:40    TPro  8.3  /  Alb  4.7  /  TBili  0.3  /  DBili  x   /  AST  18  /  ALT  44<H>  /  AlkPhos  101  04-28    PT/INR - ( 28 Apr 2019 14:40 )   PT: 10.4 SEC;   INR: 0.91          PTT - ( 28 Apr 2019 14:40 )  PTT:34.0 SEC          RADIOLOGY & ADDITIONAL TESTS:    Imaging Personally Reviewed:    Consultant(s) Notes Reviewed:      Care Discussed with Consultants/Other Providers:    Assessment and Plan: [facility]  Pager: [phone]    Patient is a 43y old  Male who presents with a chief complaint of CSF leaking from wound (30 Apr 2019 09:26)      SUBJECTIVE / OVERNIGHT EVENTS: No acute events overnight. Excited about discharge home later today. Having some dizziness, but per fiance at bedside, this has occurred since prior to surgery and has been improving. No CP, SOB, palpitations, nausea, vomiting, visual changes. Urinating well, had BM yesterday.    MEDICATIONS  (STANDING):  cefepime   IVPB 1000 milliGRAM(s) IV Intermittent every 12 hours  cefepime   IVPB      influenza   Vaccine 0.5 milliLiter(s) IntraMuscular once  lisinopril 10 milliGRAM(s) Oral daily  vancomycin  IVPB      vancomycin  IVPB 750 milliGRAM(s) IV Intermittent every 12 hours    MEDICATIONS  (PRN):  acetaminophen   Tablet .. 650 milliGRAM(s) Oral every 6 hours PRN Mild Pain (1 - 3)  oxyCODONE    IR 5 milliGRAM(s) Oral every 4 hours PRN Moderate Pain (4 - 6)  oxyCODONE    IR 10 milliGRAM(s) Oral every 4 hours PRN Severe Pain (7 - 10)      Vital Signs Last 24 Hrs  T(C): 36.7 (04-30-19 @ 11:39)  T(F): 98 (04-30-19 @ 11:39), Max: 98.6 (04-29-19 @ 13:02)  HR: 90 (04-30-19 @ 11:39) (77 - 100)  BP: 139/83 (04-30-19 @ 11:39)  BP(mean): 99 (04-29-19 @ 18:30) (97 - 111)  RR: 18 (04-30-19 @ 11:39) (10 - 27)  SpO2: 96% (04-30-19 @ 11:39) (92% - 100%)  Wt(kg): --    04-29 @ 07:01  -  04-30 @ 07:00  --------------------------------------------------------  IN: 665 mL / OUT: 800 mL / NET: -135 mL      CAPILLARY BLOOD GLUCOSE        I&O's Summary    29 Apr 2019 07:01  -  30 Apr 2019 07:00  --------------------------------------------------------  IN: 665 mL / OUT: 800 mL / NET: -135 mL    PHYSICAL EXAM:  GENERAL: NAD, well-groomed, well-developed  HEAD:  Atraumatic, Normocephalic  EYES: left eye with dysconjugate gaze, PERRLA, conjunctiva and sclera clear  ENMT: Moist mucous membranes  NECK: Supple, No JVD  RESPIRATORY: Clear to auscultation bilaterally; No rales, rhonchi, wheezing,  CARDIOVASCULAR: Regular rate and rhythm; No murmurs, rubs,  GASTROINTESTINAL: Soft, Nontender, Nondistended; Bowel sounds present  EXTREMITIES:  2+ Peripheral Pulses, No clubbing, cyanosis, or edema  NERVOUS SYSTEM:  Alert & Oriented X3; Moving all 4 extremities; No gross sensory deficits  HEME/LYMPH: No lymphadenopathy noted  SKIN: No rashes or lesions; bandage on right head C/D/I     LABS:                        15.1   3.55  )-----------( 201      ( 28 Apr 2019 14:40 )             45.6     04-28    142  |  102  |  17  ----------------------------<  102<H>  4.0   |  25  |  1.09    Ca    9.7      28 Apr 2019 14:40    TPro  8.3  /  Alb  4.7  /  TBili  0.3  /  DBili  x   /  AST  18  /  ALT  44<H>  /  AlkPhos  101  04-28    PT/INR - ( 28 Apr 2019 14:40 )   PT: 10.4 SEC;   INR: 0.91          PTT - ( 28 Apr 2019 14:40 )  PTT:34.0 SEC      RADIOLOGY & ADDITIONAL TESTS:    Imaging Personally Reviewed:  < from: MR Head w/ CSF Flow, No Cont (04.29.19 @ 13:04) >  IMPRESSION:  Evidence for flow through the third ventriculostomy.  Slight decreased size of the third and lateral ventricles in comparison   to 4/12/2019.    < end of copied text >      Consultant(s) Notes Reviewed:  NSx    Care Discussed with Consultants/Other Providers: neurosurgery ANKUR Copeland re: plan of care    Assessment and Plan:

## 2019-04-30 NOTE — DISCHARGE NOTE PROVIDER - CARE PROVIDER_API CALL
Kristian Saldana)  Neurological Surgery; Pediatric Neurological Surgery  [address], [address]  [place], [place] [id number]  Phone: [phone]  Fax: [phone]  Follow Up Time: 1 week

## 2019-04-30 NOTE — DISCHARGE NOTE NURSING/CASE MANAGEMENT/SOCIAL WORK - NSDCDPATPORTLINK_GEN_ALL_CORE
You can access the [facility] Patient Portal, offered by [facility], by registering with the following website: [email or web address]

## 2019-04-30 NOTE — PROGRESS NOTE ADULT - ASSESSMENT
42 yo M with HTN, congenital hydrocephalus c/b CVA as a child, s/p stereotactic endoscopic third ventriculostomy and ligation on 3/18/19 by Dr. Kang presents with CSF leak from surgical site. 44 yo M with HTN, congenital hydrocephalus c/b CVA as a child, s/p stereotactic endoscopic third ventriculostomy and ligation on 3/18/19 by Dr. Kang presents with CSF leak from surgical site.     Problem: CSF leak. Recommendation: Management as per Neurosurgery  -Awaiting repeat MRI and shuntogram results, plan for OR today for washout as per neurosurgery  -Neurochecks  -Agree with vancomycin and cefepime for now for meningitis ppx given concerns for CSF leakage  -Check Vancomycin trough pre-4th dose  -f/u CSF cultures, currently NTD.     Problem/Recommendation - 2:  ·  Problem: Essential hypertension.  Recommendation: BP above goal at present but going to OR  -Will monitor BP's perioperatively, if remains elevated can consider increasing lisinopril to 15mg po Qdaily  -Patient takes mother's enalapril at home (not lisinopril), but if BP can be controlled on lisinopril, would dc home on this agent given it would be a once daily medication.      Problem/Recommendation - 3:  ·  Problem: Stroke.  Recommendation: -Can resume Asa 81mg daily when ok with Nsx.

## 2019-04-30 NOTE — DISCHARGE NOTE PROVIDER - NSDCCPCAREPLAN_GEN_ALL_CORE_FT
PRINCIPAL DISCHARGE DIAGNOSIS  Diagnosis: Hydrocephalus  Assessment and Plan of Treatment: 1. Remove top surgical dressing on post operative day 3 unless it was removed by the surgical team prior to your discharge. Incision should be left uncovered after day 3.   2. Begin showering with shampoo on post operative day 4. Avoid long soaks and do not submerge incision in bathtub. Regular shower only and allow soap and water to run over the incision. Pat incision area dry with clean towel- do not scrub. Please shower regularly to ensure incision stays clean to avoid post operative infections.   3. Notify your surgeon if you notice increased redness, drainage or you notice incision area opening.   4. Return to ER immediately for high fevers, severe headache, vomiting, lethargy or  weakness  5. Please call your neurosurgeon following discharge to make follow up appointment in 1 week after discharge unless otherwise specified. See Contact information below.   6. Prescription Post operative medication, if applicable,  are sent to [facility](unless another pharmacy specified)- [facility] is located in [facility]. All post operative prescrptions should be picked up before departing the hospital  7. Ambulate as tolerate. Continue with all "activities of daily living". Avoid strenuous activity or lifting more than 10 pounds until cleared for additional activity at your follow up appointment  8. Do not return to work or school until cleared by your neurosurgeon at your follow up visit unless specified to you during your hospital stay

## 2019-05-04 LAB
BACTERIA CSF CULT: SIGNIFICANT CHANGE UP
BACTERIA CSF CULT: SIGNIFICANT CHANGE UP

## 2019-05-05 LAB — BACTERIA CSF CULT: SIGNIFICANT CHANGE UP

## 2019-05-22 ENCOUNTER — OUTPATIENT (OUTPATIENT)
Dept: OUTPATIENT SERVICES | Facility: HOSPITAL | Age: 44
LOS: 1 days | End: 2019-05-22
Payer: MEDICARE

## 2019-05-22 ENCOUNTER — APPOINTMENT (OUTPATIENT)
Dept: MRI IMAGING | Facility: CLINIC | Age: 44
End: 2019-05-22
Payer: MEDICARE

## 2019-05-22 DIAGNOSIS — Z92.89 PERSONAL HISTORY OF OTHER MEDICAL TREATMENT: Chronic | ICD-10-CM

## 2019-05-22 DIAGNOSIS — Z00.8 ENCOUNTER FOR OTHER GENERAL EXAMINATION: ICD-10-CM

## 2019-05-22 DIAGNOSIS — Z98.2 PRESENCE OF CEREBROSPINAL FLUID DRAINAGE DEVICE: Chronic | ICD-10-CM

## 2019-05-22 DIAGNOSIS — G91.9 HYDROCEPHALUS, UNSPECIFIED: ICD-10-CM

## 2019-05-22 DIAGNOSIS — K40.20 BILATERAL INGUINAL HERNIA, WITHOUT OBSTRUCTION OR GANGRENE, NOT SPECIFIED AS RECURRENT: Chronic | ICD-10-CM

## 2019-05-22 PROCEDURE — 70551 MRI BRAIN STEM W/O DYE: CPT

## 2019-05-22 PROCEDURE — 70551 MRI BRAIN STEM W/O DYE: CPT | Mod: 26

## 2019-06-01 ENCOUNTER — OUTPATIENT (OUTPATIENT)
Dept: OUTPATIENT SERVICES | Facility: HOSPITAL | Age: 44
LOS: 1 days | End: 2019-06-01

## 2019-06-01 DIAGNOSIS — Z98.2 PRESENCE OF CEREBROSPINAL FLUID DRAINAGE DEVICE: Chronic | ICD-10-CM

## 2019-06-01 DIAGNOSIS — K40.20 BILATERAL INGUINAL HERNIA, WITHOUT OBSTRUCTION OR GANGRENE, NOT SPECIFIED AS RECURRENT: Chronic | ICD-10-CM

## 2019-06-01 DIAGNOSIS — Z92.89 PERSONAL HISTORY OF OTHER MEDICAL TREATMENT: Chronic | ICD-10-CM

## 2019-06-03 ENCOUNTER — INPATIENT (INPATIENT)
Facility: HOSPITAL | Age: 44
LOS: 0 days | Discharge: HOME | End: 2019-06-04
Attending: SURGERY | Admitting: SURGERY
Payer: MEDICARE

## 2019-06-03 VITALS
HEART RATE: 100 BPM | SYSTOLIC BLOOD PRESSURE: 160 MMHG | DIASTOLIC BLOOD PRESSURE: 90 MMHG | TEMPERATURE: 99 F | OXYGEN SATURATION: 100 % | RESPIRATION RATE: 16 BRPM

## 2019-06-03 VITALS
HEART RATE: 96 BPM | TEMPERATURE: 99 F | DIASTOLIC BLOOD PRESSURE: 90 MMHG | WEIGHT: 179.9 LBS | RESPIRATION RATE: 16 BRPM | SYSTOLIC BLOOD PRESSURE: 150 MMHG | OXYGEN SATURATION: 98 %

## 2019-06-03 DIAGNOSIS — K40.20 BILATERAL INGUINAL HERNIA, WITHOUT OBSTRUCTION OR GANGRENE, NOT SPECIFIED AS RECURRENT: Chronic | ICD-10-CM

## 2019-06-03 DIAGNOSIS — Z92.89 PERSONAL HISTORY OF OTHER MEDICAL TREATMENT: Chronic | ICD-10-CM

## 2019-06-03 DIAGNOSIS — Z98.2 PRESENCE OF CEREBROSPINAL FLUID DRAINAGE DEVICE: Chronic | ICD-10-CM

## 2019-06-03 LAB
ALBUMIN SERPL ELPH-MCNC: 4.5 G/DL — SIGNIFICANT CHANGE UP (ref 3.3–5)
ALP SERPL-CCNC: 144 U/L — HIGH (ref 40–120)
ALT FLD-CCNC: 53 U/L — HIGH (ref 4–41)
ANION GAP SERPL CALC-SCNC: 14 MMO/L — SIGNIFICANT CHANGE UP (ref 7–14)
APPEARANCE UR: CLEAR — SIGNIFICANT CHANGE UP
AST SERPL-CCNC: 23 U/L — SIGNIFICANT CHANGE UP (ref 4–40)
BASE EXCESS BLDV CALC-SCNC: 3.7 MMOL/L — SIGNIFICANT CHANGE UP
BASOPHILS # BLD AUTO: 0.04 K/UL — SIGNIFICANT CHANGE UP (ref 0–0.2)
BASOPHILS NFR BLD AUTO: 0.6 % — SIGNIFICANT CHANGE UP (ref 0–2)
BILIRUB SERPL-MCNC: 0.4 MG/DL — SIGNIFICANT CHANGE UP (ref 0.2–1.2)
BILIRUB UR-MCNC: NEGATIVE — SIGNIFICANT CHANGE UP
BLOOD GAS VENOUS - CREATININE: 0.96 MG/DL — SIGNIFICANT CHANGE UP (ref 0.5–1.3)
BLOOD GAS VENOUS - FIO2: 21 — SIGNIFICANT CHANGE UP
BLOOD UR QL VISUAL: NEGATIVE — SIGNIFICANT CHANGE UP
BUN SERPL-MCNC: 10 MG/DL — SIGNIFICANT CHANGE UP (ref 7–23)
CALCIUM SERPL-MCNC: 10.1 MG/DL — SIGNIFICANT CHANGE UP (ref 8.4–10.5)
CHLORIDE BLDV-SCNC: 104 MMOL/L — SIGNIFICANT CHANGE UP (ref 96–108)
CHLORIDE SERPL-SCNC: 101 MMOL/L — SIGNIFICANT CHANGE UP (ref 98–107)
CO2 SERPL-SCNC: 25 MMOL/L — SIGNIFICANT CHANGE UP (ref 22–31)
COLOR SPEC: SIGNIFICANT CHANGE UP
CREAT SERPL-MCNC: 0.98 MG/DL — SIGNIFICANT CHANGE UP (ref 0.5–1.3)
EOSINOPHIL # BLD AUTO: 0.43 K/UL — SIGNIFICANT CHANGE UP (ref 0–0.5)
EOSINOPHIL NFR BLD AUTO: 6.4 % — HIGH (ref 0–6)
GAS PNL BLDV: 139 MMOL/L — SIGNIFICANT CHANGE UP (ref 136–146)
GLUCOSE BLDV-MCNC: 104 MG/DL — HIGH (ref 70–99)
GLUCOSE SERPL-MCNC: 107 MG/DL — HIGH (ref 70–99)
GLUCOSE UR-MCNC: NEGATIVE — SIGNIFICANT CHANGE UP
HCO3 BLDV-SCNC: 26 MMOL/L — SIGNIFICANT CHANGE UP (ref 20–27)
HCT VFR BLD CALC: 43.1 % — SIGNIFICANT CHANGE UP (ref 39–50)
HCT VFR BLDV CALC: 45.1 % — SIGNIFICANT CHANGE UP (ref 39–51)
HGB BLD-MCNC: 14.1 G/DL — SIGNIFICANT CHANGE UP (ref 13–17)
HGB BLDV-MCNC: 14.7 G/DL — SIGNIFICANT CHANGE UP (ref 13–17)
IMM GRANULOCYTES NFR BLD AUTO: 0.1 % — SIGNIFICANT CHANGE UP (ref 0–1.5)
KETONES UR-MCNC: NEGATIVE — SIGNIFICANT CHANGE UP
LACTATE BLDV-MCNC: 1.3 MMOL/L — SIGNIFICANT CHANGE UP (ref 0.5–2)
LEUKOCYTE ESTERASE UR-ACNC: NEGATIVE — SIGNIFICANT CHANGE UP
LIDOCAIN IGE QN: 125.5 U/L — HIGH (ref 7–60)
LYMPHOCYTES # BLD AUTO: 1.52 K/UL — SIGNIFICANT CHANGE UP (ref 1–3.3)
LYMPHOCYTES # BLD AUTO: 22.6 % — SIGNIFICANT CHANGE UP (ref 13–44)
MCHC RBC-ENTMCNC: 29 PG — SIGNIFICANT CHANGE UP (ref 27–34)
MCHC RBC-ENTMCNC: 32.7 % — SIGNIFICANT CHANGE UP (ref 32–36)
MCV RBC AUTO: 88.5 FL — SIGNIFICANT CHANGE UP (ref 80–100)
MONOCYTES # BLD AUTO: 0.63 K/UL — SIGNIFICANT CHANGE UP (ref 0–0.9)
MONOCYTES NFR BLD AUTO: 9.3 % — SIGNIFICANT CHANGE UP (ref 2–14)
NEUTROPHILS # BLD AUTO: 4.11 K/UL — SIGNIFICANT CHANGE UP (ref 1.8–7.4)
NEUTROPHILS NFR BLD AUTO: 61 % — SIGNIFICANT CHANGE UP (ref 43–77)
NITRITE UR-MCNC: NEGATIVE — SIGNIFICANT CHANGE UP
NRBC # FLD: 0 K/UL — SIGNIFICANT CHANGE UP (ref 0–0)
PCO2 BLDV: 50 MMHG — SIGNIFICANT CHANGE UP (ref 41–51)
PH BLDV: 7.38 PH — SIGNIFICANT CHANGE UP (ref 7.32–7.43)
PH UR: 6.5 — SIGNIFICANT CHANGE UP (ref 5–8)
PLATELET # BLD AUTO: 284 K/UL — SIGNIFICANT CHANGE UP (ref 150–400)
PMV BLD: 10 FL — SIGNIFICANT CHANGE UP (ref 7–13)
PO2 BLDV: 39 MMHG — SIGNIFICANT CHANGE UP (ref 35–40)
POTASSIUM BLDV-SCNC: 3.9 MMOL/L — SIGNIFICANT CHANGE UP (ref 3.4–4.5)
POTASSIUM SERPL-MCNC: 4 MMOL/L — SIGNIFICANT CHANGE UP (ref 3.5–5.3)
POTASSIUM SERPL-SCNC: 4 MMOL/L — SIGNIFICANT CHANGE UP (ref 3.5–5.3)
PROT SERPL-MCNC: 8.3 G/DL — SIGNIFICANT CHANGE UP (ref 6–8.3)
PROT UR-MCNC: 10 — SIGNIFICANT CHANGE UP
RBC # BLD: 4.87 M/UL — SIGNIFICANT CHANGE UP (ref 4.2–5.8)
RBC # FLD: 12.5 % — SIGNIFICANT CHANGE UP (ref 10.3–14.5)
SAO2 % BLDV: 70.4 % — SIGNIFICANT CHANGE UP (ref 60–85)
SODIUM SERPL-SCNC: 140 MMOL/L — SIGNIFICANT CHANGE UP (ref 135–145)
SP GR SPEC: 1.02 — SIGNIFICANT CHANGE UP (ref 1–1.04)
UROBILINOGEN FLD QL: NORMAL — SIGNIFICANT CHANGE UP
WBC # BLD: 6.74 K/UL — SIGNIFICANT CHANGE UP (ref 3.8–10.5)
WBC # FLD AUTO: 6.74 K/UL — SIGNIFICANT CHANGE UP (ref 3.8–10.5)

## 2019-06-03 PROCEDURE — 74177 CT ABD & PELVIS W/CONTRAST: CPT | Mod: 26

## 2019-06-03 NOTE — ED PROVIDER NOTE - NSFOLLOWUPINSTRUCTIONS_ED_ALL_ED_FT
please return to ED immediately for any new or worsening symptoms such as abdominal pain, vomiting, fever, inability to tolerate eating and drinking.

## 2019-06-03 NOTE — ED ADULT NURSE NOTE - OBJECTIVE STATEMENT
pt is in bed A and Ox  3 in NAD, pt reports " since this morning I am having sharp pain on the left side f my stomach" denies N./V fever or chills, denies constipation or diarrhea. abd soft non distended BS present 4 quadrants. noted contraction and deformity fo the left extremity, noted scar tissue with scalp of abnormal shape, pt reports hx of hydrocephalus with  shunt. denies HA/ changes in vision. orders noted and completed.

## 2019-06-03 NOTE — ED PROVIDER NOTE - SKIN, MLM
Skin normal color for race, warm, dry and intact. No evidence of rash.  shunt incisions C/D/I without drainage or tenderness

## 2019-06-03 NOTE — ED PROVIDER NOTE - ATTENDING CONTRIBUTION TO CARE
Dr. Marin: I have personally performed a face to face bedside history and physical examination of this patient. I have discussed the history, examination, review of systems, assessment and plan of management with the resident. I have reviewed the electronic medical record and amended it to reflect my history, review of systems, physical exam, assessment and plan.     Attending Exam - Dr. Marin: GEN: well appearing, NAD  HEENT: +PERRL, EOMI  RESP: CTAB, no signs of respiratory distress CV: s1s2 RRR ABD: soft/non distended, tender to LLQ  MSK: no deformities / swelling, normal range of motion, spine grossly normal NEURO: alert, non focal exam SKIN: normal color / temperature / condition.

## 2019-06-03 NOTE — ED PROVIDER NOTE - PROGRESS NOTE DETAILS
Kaushal BEST: Patient was signed out by Dr. Marin pending CT Scan. CT concerning for possible appendicitis. Patient seen by surgery who do not think patient has appendicitis. Recommending PO challenge and reassessment at this time. Will PO challenge. Patient has been asking to eat. Kaushal BEST: Patient was signed out by Dr. Marin pending CT Scan. CT concerning for possible appendicitis. Patient seen by surgery who do not think patient has appendicitis. Recommending PO challenge and reassessment at this time. Will PO challenge. Patient has been asking to eat. Patient reassessed - states he has pain on left side. Abd soft, nt, nd. Of note, Dr. Arango called neurosurgery to inform them that patient was here, stated  shunt, not functioning. No suspicion for CNS pathology - consult as necessary. Conchita PGY3: Pt seen and evaluated by surgery; recommended to PO challenge and reassess. CDU contacted but no beds available. Will observe in ED for now Conchita PGY3: D/w neurosurgery; stated that  shunt has been ligated and is no longer draining into peritoneum Kely: spoke with surgery resident, Hanh. Patient to be admitted for serial abdominal exams to Dr. Cosby. Kely: Patient and mother requesting to leave. Spoke with surgery resident and patient with capacity to leave AMA. The patient has decided to leave against medical advice.  It has been explained to the patient that leaving prior to completion of work up and treatment may result in recurrent or worsening of symptoms, severe permanent disability, pain and suffering, and/or even death.  The patient has demonstrated comprehension and verbalizes understanding of these risks.  The patient has been told that he/she must return to the ER immediately for persistent or recurring symptoms, worsening symptoms, or any concerning symptoms.  The patient has also been informed that they may return to the ER immediately at any time if they change their mind and wish to resume care. The patient has been given the opportunity to ask questions about their medical condition.

## 2019-06-03 NOTE — ED PROVIDER NOTE - FAMILY HISTORY
Family history of hypertension, mother and father     Sibling  Still living? Yes, Estimated age: Age Unknown  Family history of hypertension in brother, Age at diagnosis: Age Unknown

## 2019-06-03 NOTE — ED PROVIDER NOTE - PSH
Bilateral inguinal hernia    History of creation of ventriculoperitoneal shunt  at couple months old in [place] - left shunt currently not working  S/P  shunt  at 11 yrs old at that time patient had stroke

## 2019-06-03 NOTE — ED PROVIDER NOTE - CLINICAL SUMMARY MEDICAL DECISION MAKING FREE TEXT BOX
43M PMH congenital hydrocephalus with recent revision p/w RLQ->LLQ pain; concern for possible intrabd process such as abscess, diverticulitis or infection surrounding  shunt. Will check CT A/P. No suspicion for CNS pathology.

## 2019-06-04 ENCOUNTER — INPATIENT (INPATIENT)
Facility: HOSPITAL | Age: 44
LOS: 1 days | Discharge: HOME | End: 2019-06-06
Attending: HOSPITALIST | Admitting: HOSPITALIST
Payer: MEDICARE

## 2019-06-04 VITALS
DIASTOLIC BLOOD PRESSURE: 76 MMHG | SYSTOLIC BLOOD PRESSURE: 131 MMHG | OXYGEN SATURATION: 100 % | HEART RATE: 100 BPM | TEMPERATURE: 99 F | RESPIRATION RATE: 16 BRPM

## 2019-06-04 DIAGNOSIS — R10.9 UNSPECIFIED ABDOMINAL PAIN: ICD-10-CM

## 2019-06-04 DIAGNOSIS — Z98.2 PRESENCE OF CEREBROSPINAL FLUID DRAINAGE DEVICE: Chronic | ICD-10-CM

## 2019-06-04 DIAGNOSIS — K40.20 BILATERAL INGUINAL HERNIA, WITHOUT OBSTRUCTION OR GANGRENE, NOT SPECIFIED AS RECURRENT: Chronic | ICD-10-CM

## 2019-06-04 DIAGNOSIS — R10.32 LEFT LOWER QUADRANT PAIN: ICD-10-CM

## 2019-06-04 DIAGNOSIS — I10 ESSENTIAL (PRIMARY) HYPERTENSION: ICD-10-CM

## 2019-06-04 DIAGNOSIS — Z92.89 PERSONAL HISTORY OF OTHER MEDICAL TREATMENT: Chronic | ICD-10-CM

## 2019-06-04 DIAGNOSIS — G91.9 HYDROCEPHALUS, UNSPECIFIED: ICD-10-CM

## 2019-06-04 DIAGNOSIS — Z71.89 OTHER SPECIFIED COUNSELING: ICD-10-CM

## 2019-06-04 LAB
ALBUMIN SERPL ELPH-MCNC: 4.6 G/DL — SIGNIFICANT CHANGE UP (ref 3.3–5)
ALP SERPL-CCNC: 159 U/L — HIGH (ref 40–120)
ALT FLD-CCNC: 71 U/L — HIGH (ref 4–41)
ANION GAP SERPL CALC-SCNC: 14 MMO/L — SIGNIFICANT CHANGE UP (ref 7–14)
AST SERPL-CCNC: 47 U/L — HIGH (ref 4–40)
BASE EXCESS BLDV CALC-SCNC: 4.8 MMOL/L — SIGNIFICANT CHANGE UP
BASOPHILS # BLD AUTO: 0.03 K/UL — SIGNIFICANT CHANGE UP (ref 0–0.2)
BASOPHILS NFR BLD AUTO: 0.5 % — SIGNIFICANT CHANGE UP (ref 0–2)
BILIRUB SERPL-MCNC: 0.3 MG/DL — SIGNIFICANT CHANGE UP (ref 0.2–1.2)
BLOOD GAS VENOUS - CREATININE: SIGNIFICANT CHANGE UP MG/DL (ref 0.5–1.3)
BUN SERPL-MCNC: 15 MG/DL — SIGNIFICANT CHANGE UP (ref 7–23)
CALCIUM SERPL-MCNC: 9.9 MG/DL — SIGNIFICANT CHANGE UP (ref 8.4–10.5)
CHLORIDE BLDV-SCNC: 105 MMOL/L — SIGNIFICANT CHANGE UP (ref 96–108)
CHLORIDE SERPL-SCNC: 100 MMOL/L — SIGNIFICANT CHANGE UP (ref 98–107)
CO2 SERPL-SCNC: 27 MMOL/L — SIGNIFICANT CHANGE UP (ref 22–31)
CREAT SERPL-MCNC: 1.06 MG/DL — SIGNIFICANT CHANGE UP (ref 0.5–1.3)
EOSINOPHIL # BLD AUTO: 0.47 K/UL — SIGNIFICANT CHANGE UP (ref 0–0.5)
EOSINOPHIL NFR BLD AUTO: 7.6 % — HIGH (ref 0–6)
GAS PNL BLDV: 140 MMOL/L — SIGNIFICANT CHANGE UP (ref 136–146)
GLUCOSE BLDV-MCNC: 109 MG/DL — HIGH (ref 70–99)
GLUCOSE SERPL-MCNC: 111 MG/DL — HIGH (ref 70–99)
HCO3 BLDV-SCNC: 26 MMOL/L — SIGNIFICANT CHANGE UP (ref 20–27)
HCT VFR BLD CALC: 44.7 % — SIGNIFICANT CHANGE UP (ref 39–50)
HCT VFR BLDV CALC: 47.4 % — SIGNIFICANT CHANGE UP (ref 39–51)
HGB BLD-MCNC: 14.7 G/DL — SIGNIFICANT CHANGE UP (ref 13–17)
HGB BLDV-MCNC: 15.5 G/DL — SIGNIFICANT CHANGE UP (ref 13–17)
IMM GRANULOCYTES NFR BLD AUTO: 0.2 % — SIGNIFICANT CHANGE UP (ref 0–1.5)
LACTATE BLDV-MCNC: 1.8 MMOL/L — SIGNIFICANT CHANGE UP (ref 0.5–2)
LYMPHOCYTES # BLD AUTO: 1.79 K/UL — SIGNIFICANT CHANGE UP (ref 1–3.3)
LYMPHOCYTES # BLD AUTO: 29 % — SIGNIFICANT CHANGE UP (ref 13–44)
MCHC RBC-ENTMCNC: 29.2 PG — SIGNIFICANT CHANGE UP (ref 27–34)
MCHC RBC-ENTMCNC: 32.9 % — SIGNIFICANT CHANGE UP (ref 32–36)
MCV RBC AUTO: 88.7 FL — SIGNIFICANT CHANGE UP (ref 80–100)
MONOCYTES # BLD AUTO: 0.61 K/UL — SIGNIFICANT CHANGE UP (ref 0–0.9)
MONOCYTES NFR BLD AUTO: 9.9 % — SIGNIFICANT CHANGE UP (ref 2–14)
NEUTROPHILS # BLD AUTO: 3.27 K/UL — SIGNIFICANT CHANGE UP (ref 1.8–7.4)
NEUTROPHILS NFR BLD AUTO: 52.8 % — SIGNIFICANT CHANGE UP (ref 43–77)
NRBC # FLD: 0 K/UL — SIGNIFICANT CHANGE UP (ref 0–0)
PCO2 BLDV: 57 MMHG — HIGH (ref 41–51)
PH BLDV: 7.34 PH — SIGNIFICANT CHANGE UP (ref 7.32–7.43)
PLATELET # BLD AUTO: 297 K/UL — SIGNIFICANT CHANGE UP (ref 150–400)
PMV BLD: 10.5 FL — SIGNIFICANT CHANGE UP (ref 7–13)
PO2 BLDV: 28 MMHG — LOW (ref 35–40)
POTASSIUM BLDV-SCNC: 3.8 MMOL/L — SIGNIFICANT CHANGE UP (ref 3.4–4.5)
POTASSIUM SERPL-MCNC: 3.9 MMOL/L — SIGNIFICANT CHANGE UP (ref 3.5–5.3)
POTASSIUM SERPL-SCNC: 3.9 MMOL/L — SIGNIFICANT CHANGE UP (ref 3.5–5.3)
PROT SERPL-MCNC: 8.7 G/DL — HIGH (ref 6–8.3)
RBC # BLD: 5.04 M/UL — SIGNIFICANT CHANGE UP (ref 4.2–5.8)
RBC # FLD: 12.6 % — SIGNIFICANT CHANGE UP (ref 10.3–14.5)
SAO2 % BLDV: 42.9 % — LOW (ref 60–85)
SODIUM SERPL-SCNC: 141 MMOL/L — SIGNIFICANT CHANGE UP (ref 135–145)
WBC # BLD: 6.18 K/UL — SIGNIFICANT CHANGE UP (ref 3.8–10.5)
WBC # FLD AUTO: 6.18 K/UL — SIGNIFICANT CHANGE UP (ref 3.8–10.5)

## 2019-06-04 PROCEDURE — 99223 1ST HOSP IP/OBS HIGH 75: CPT

## 2019-06-04 RX ORDER — SODIUM CHLORIDE 9 MG/ML
1000 INJECTION INTRAMUSCULAR; INTRAVENOUS; SUBCUTANEOUS ONCE
Refills: 0 | Status: COMPLETED | OUTPATIENT
Start: 2019-06-04 | End: 2019-06-04

## 2019-06-04 RX ORDER — ACETAMINOPHEN 500 MG
650 TABLET ORAL EVERY 6 HOURS
Refills: 0 | Status: DISCONTINUED | OUTPATIENT
Start: 2019-06-04 | End: 2019-06-06

## 2019-06-04 RX ORDER — ACETAMINOPHEN 500 MG
650 TABLET ORAL EVERY 6 HOURS
Refills: 0 | Status: DISCONTINUED | OUTPATIENT
Start: 2019-06-04 | End: 2019-06-04

## 2019-06-04 RX ORDER — ENOXAPARIN SODIUM 100 MG/ML
40 INJECTION SUBCUTANEOUS DAILY
Refills: 0 | Status: DISCONTINUED | OUTPATIENT
Start: 2019-06-04 | End: 2019-06-04

## 2019-06-04 RX ADMIN — Medication 650 MILLIGRAM(S): at 22:43

## 2019-06-04 RX ADMIN — Medication 10 MILLIGRAM(S): at 22:42

## 2019-06-04 RX ADMIN — SODIUM CHLORIDE 1000 MILLILITER(S): 9 INJECTION INTRAMUSCULAR; INTRAVENOUS; SUBCUTANEOUS at 12:42

## 2019-06-04 NOTE — H&P ADULT - HISTORY OF PRESENT ILLNESS
43M hx of congenital hydrocephalus s/p  shunt, b/l inguinal hernia repairs who presents with one day of abdominal pain. He had sharp RLQ pain wake him from sleep at 4AM. He slept a bit more but woke up with LLQ pain at 11AM. He denies fever, chills, nausea/vomiting, diarrhea. he denies dysuria/hematuria, hematochezia, melena.  Of note, his  shunt, was placed originally when he was an infant, and the abdominal portion was last revised when he was 11. He last had the intracranial portion ligated in April.  Vital Signs Last 24 Hrs  T(C): 37.2 (03 Jun 2019 23:19), Max: 37.3 (03 Jun 2019 16:58)  T(F): 99 (03 Jun 2019 23:19), Max: 99.1 (03 Jun 2019 16:58)  HR: 100 (03 Jun 2019 23:19) (88 - 100)  BP: 160/90 (03 Jun 2019 23:19) (139/81 - 160/90)  BP(mean): --  RR: 16 (03 Jun 2019 23:19) (16 - 18)  SpO2: 100% (03 Jun 2019 23:19) (98% - 100%)

## 2019-06-04 NOTE — H&P ADULT - ASSESSMENT
44 yo M with h/o congenital hydrocephalus s/p  shunt and recent revisions (3/18/19 and 4/29/19), prior CVA now presenting with LLQ abdominal pain x 2 days.

## 2019-06-04 NOTE — H&P ADULT - NSHPPHYSICALEXAM_GEN_ALL_CORE
T(C): 37.8 (06-04-19 @ 20:59), Max: 37.8 (06-04-19 @ 20:59)  HR: 88 (06-04-19 @ 20:59) (87 - 100)  BP: 152/80 (06-04-19 @ 20:59) (131/76 - 160/90)  RR: 16 (06-04-19 @ 20:59) (16 - 16)  SpO2: 100% (06-04-19 @ 20:59) (100% - 100%)    GENERAL: No acute distress, well-developed  HEAD:  Atraumatic, Normocephalic  ENT: EOMI, PERRLA, conjunctiva and sclera clear, Neck supple, No JVD, moist mucosa, no pharyngeal erythema, no tonsillar enlargement or exudate  CHEST/LUNG: Clear to auscultation bilaterally; No wheeze, equal breath sounds bilaterally   HEART: Regular rate and rhythm; No murmurs, rubs, or gallops  ABDOMEN: Soft, Nontender, Nondistended; Bowel sounds present, no organomegaly  EXTREMITIES:  2+ Peripheral Pulses, No clubbing, cyanosis, or edema  PSYCH: AAOx3, normal affect, normal  behavior   NEUROLOGY: non-focal, cranial nerves intact  SKIN: Normal color, No rashes or lesions

## 2019-06-04 NOTE — ED ADULT NURSE NOTE - NSIMPLEMENTINTERV_GEN_ALL_ED
Implemented All Universal Safety Interventions:  [place] to call system. Call bell, personal items and telephone within reach. Instruct patient to call for assistance. Room bathroom lighting operational. Non-slip footwear when patient is off stretcher. Physically safe environment: no spills, clutter or unnecessary equipment. Stretcher in lowest position, wheels locked, appropriate side rails in place.

## 2019-06-04 NOTE — H&P ADULT - PROBLEM SELECTOR PLAN 3
- s/p  shunt and recent revision   - f/u MRI - s/p  shunt and recent revision   - Neurosurgery recs appreciated. f/u MRI brain with CSF flow

## 2019-06-04 NOTE — H&P ADULT - PROBLEM SELECTOR PLAN 1
- Etiology unclear. Pt evaluated by surgery service. Low suspicion for appendicitis. No surgical intervention at this time  - Unclear if this I related to  shunt- MRI was ordered, will f/u  - Pt declined taking pain meds for now  - - Etiology unclear. Pt evaluated by surgery service. Low suspicion for appendicitis. No surgical intervention at this time  - Unclear if this is related to  shunt. Per neurosx, this is less likely- MRI was ordered, will f/u  - Pt declined taking pain meds for now

## 2019-06-04 NOTE — H&P ADULT - NSHPPHYSICALEXAM_GEN_ALL_CORE
NAD, awake and alert  No rashes  No jaundice or scleral icterus  Respirations nonlabored  CV Regular  Abdomen soft, tender LLQ, mildly distended  No guarding or rebound tenderness  Transverse scar in R hemiabdomen  Extremities warm

## 2019-06-04 NOTE — ED ADULT NURSE NOTE - CHIEF COMPLAINT QUOTE
pt comes to ED for abdominal pain. pt was seen at [facility] yesterday and d/c ed pt was seen by surgery team for possible appendicitis . pt is coming back for worsening pain. pt deneis NVD pt VSS pt appears comfortable NAD

## 2019-06-04 NOTE — CONSULT NOTE ADULT - ATTENDING COMMENTS
I have personally interviewed and examined this patient, reviewed pertinent labs and imaging, and discussed the case with colleagues, residents, and physician assistants on B Team rounds.     CT reviewed with radiology, appendix does not cross midline. Pain is left lower quadrant, positional, and has no accompanying gi symptoms - no nausea, no anorexia, having normal bowel function. Urbano score for appendicitis = 0. Low suspicion for appendicitis. However, as patient already left AMA and returned, would consider observation and workup of other etiologies of his pain.   Would consider outp colonoscopy to evaluate mild appendiceal dilation.       The Acute Care Surgery (B Team) Attending Group Practice:  Dr. Chi Damian, Dr. Nisha Cosby, Dr. Jamie Asencio, Dr. Helen Kelley, Dr. Claudio Ramos    urgent issues - spectra [id number] or [id number]  nonurgent issues - [phone]  patient appointments or afterhours - [phone]

## 2019-06-04 NOTE — H&P ADULT - NSICDXPASTSURGICALHX_GEN_ALL_CORE_FT
PAST SURGICAL HISTORY:  Bilateral inguinal hernia     History of creation of ventriculoperitoneal shunt at couple months old in [place] - left shunt currently not working    S/P  shunt at 11 yrs old at that time patient had stroke

## 2019-06-04 NOTE — ED PROVIDER NOTE - OBJECTIVE STATEMENT
43 yr old M c congenital hydrocephalus,  shunt x 2 , recent revisions, who p/w persistent abd pain.  Seen in this ED yesterday for same, CT possible early appy, and admitted to surgery.  States he was able ot tolerate PO overnight and was dc'd by surgery team.  Reports pain is unchanged, and severe. No further vomiting but hasn't really eaten much today.  No fever/chills.

## 2019-06-04 NOTE — CONSULT NOTE ADULT - ASSESSMENT
43year old male with PMH of Hydrocephalus VPS which was ligated and ETv was done 3/25/19, he developed a fluid collection and VPS was then religated 4/29/19 who p/w persistent abd pain.  Seen in this ED yesterday for same, CT possible early appy, and admitted to surgery, then discharged after tolerating PO.
43year old male with PMH of Hydrocephalus VPS which was ligated and ETv was done 3/25/19, he developed a fluid collection and VPS was then religated 4/29/19 who p/w persistent abd pain.  Seen in this ED yesterday for same, CT possible early appy, and admitted to surgery, then discharged after tolerating PO.

## 2019-06-04 NOTE — CONSULT NOTE ADULT - PROBLEM SELECTOR RECOMMENDATION 9
1. No acute neurosurgical intervention indicated at this time.  2. Please obtain MRI Brain with CSF Flow while inpatient.  3. Abdominal pain likely unrelated to VPS catheter, will defer management to gen surg  Case d/w Dr. Saldana
1. No acute neurosurgical intervention indicated at this time.  2. Please obtain MRI Brain with CSF Flow to ensure patent ETV.  3. Would defer abdominal pain management to gen surg given CT findings.  Case d/w Dr. Saldana

## 2019-06-04 NOTE — ED ADULT NURSE REASSESSMENT NOTE - NS ED NURSE REASSESS COMMENT FT1
Pt. admitted to medicine for abdominal pain.  Pt. was told by surgical team to be NPO. AS per MAR Pt. will not be going to surgery and can eat.  Pt. informed that he can eat. awaiting bed assignment. will continue to monitor. ST

## 2019-06-04 NOTE — H&P ADULT - NSHPLABSRESULTS_GEN_ALL_CORE
CBC Full  -  ( 03 Jun 2019 15:55 )  WBC Count : 6.74 K/uL  RBC Count : 4.87 M/uL  Hemoglobin : 14.1 g/dL  Hematocrit : 43.1 %  Platelet Count - Automated : 284 K/uL  Mean Cell Volume : 88.5 fL  Mean Cell Hemoglobin : 29.0 pg  Mean Cell Hemoglobin Concentration : 32.7 %  Auto Neutrophil # : 4.11 K/uL  Auto Lymphocyte # : 1.52 K/uL  Auto Monocyte # : 0.63 K/uL  Auto Eosinophil # : 0.43 K/uL  Auto Basophil # : 0.04 K/uL  Auto Neutrophil % : 61.0 %  Auto Lymphocyte % : 22.6 %  Auto Monocyte % : 9.3 %  Auto Eosinophil % : 6.4 %  Auto Basophil % : 0.6 %    06-03    140  |  101  |  10  ----------------------------<  107<H>  4.0   |  25  |  0.98    Ca    10.1      03 Jun 2019 15:55    TPro  8.3  /  Alb  4.5  /  TBili  0.4  /  DBili  x   /  AST  23  /  ALT  53<H>  /  AlkPhos  144<H>  06-03    < from: CT Abdomen and Pelvis w/ IV Cont (06.03.19 @ 18:17) >    FINDINGS:    LOWER CHEST: Lungs are clear. Heart is normal in size.    LIVER: Normal morphology. No suspicious lesion  BILE DUCTS: Normal caliber.  GALLBLADDER: Within normal limits.  SPLEEN: Within normal limits.  PANCREAS: Within normal limits.  ADRENALS: Within normal limits.  KIDNEYS/URETERS: Symmetric parenchymal enhancement. No hydronephrosis.   Bilateral subcentimeter hypodensities are too small to characterize.    BLADDER: Within normal limits.  REPRODUCTIVE ORGANS: Prostate is normal in size.    BOWEL: No bowel obstruction. Gestation the base of the appendix, likely   an appendicolith. The distal appendix is fluid-filled and measures 0.9   cm. There is fluid adjacent to the base the appendix. Scattered areas of   colonic diverticulosis without evidence of diverticulitis.  PERITONEUM: Small free fluid in the pelvis. A ventriculoperitoneal shunt   catheter is partially imaged. The catheter is seen coursing through the   soft tissues of the right thorax and abdomen before entering the   peritoneum the level of the mid abdomen. The catheter terminates within   the anterior mid abdomen. There are mild infiltrative changes around the   peritoneal portion of the catheter, no fluid collection or abscess.  VESSELS:  Within normal limits.  RETROPERITONEUM: No lymphadenopathy.    ABDOMINAL WALL: Within normal limits.  BONES: There is a sclerotic lesion within the left ilium.    IMPRESSION:     Findings which may suggest early appendicitis.    No fluid collection or abscess involving the ventriculoperitoneal shunt   catheter.        < end of copied text >

## 2019-06-04 NOTE — H&P ADULT - NSHPREVIEWOFSYSTEMS_GEN_ALL_CORE
REVIEW OF SYSTEMS:    CONSTITUTIONAL: No weakness, fevers or chills, no weight loss  EYES/ENT: No visual changes;  No dysphagia or odynophagia, no tinnitus  NECK: No pain or stiffness  RESPIRATORY: No cough, wheezing, hemoptysis; No shortness of breath  CARDIOVASCULAR: No chest pain or palpitations; No lower extremity edema  GASTROINTESTINAL: + abdominal pain. No nausea, vomiting, or hematemesis; No diarrhea or constipation. No melena or hematochezia.  MUSCULOSKELETAL: No joint pain, swelling, erythema or warmth, no back pain  GENITOURINARY: No dysuria, frequency or hematuria, no suprapubic pain  NEUROLOGICAL: No numbness or weakness, no headache, no syncope, no gait abnormalities   SKIN: No itching, burning, rashes, or lesions   All other review of systems is negative unless indicated above.

## 2019-06-04 NOTE — H&P ADULT - ASSESSMENT
43M hx of  shunt w/ abdominal pain    - unlikely appendicitis given normal WBC, afebrile, LLQ pain  - will observe off abx, PO challenge  - pain control  - VTE ppx    B Raul BEST  B Team   [id number]

## 2019-06-04 NOTE — H&P ADULT - NSICDXPASTSURGICALHX_GEN_ALL_CORE_FT
PAST SURGICAL HISTORY:  Bilateral inguinal hernia     History of creation of ventriculoperitoneal shunt at couple months old in [place] - left shunt currently not working    S/P  shunt at 11 yrs old at that time patient had stroke

## 2019-06-04 NOTE — CONSULT NOTE ADULT - ASSESSMENT
43M hx of  shunt w/ abdominal pain    - unlikely appendicitis given normal WBC, afebrile, LLQ pain  - recommend medicine adm for workup of other etiologies of abdominal pain  - recommend Nsx consult as patient has been followed by nsx fo  shunt, possible referred pain from spinal etiology?  - surgery will follow along, recommend observing off abx, would rescan and consider surgery if pain continued to worsen, patient clinically declines  - discussed with Dr. Kelley    [id number]

## 2019-06-04 NOTE — H&P ADULT - HISTORY OF PRESENT ILLNESS
42 yo M with h/o congenital hydrocephalus s/p  shunt and recent revisions (3/18/19 and 4/29/19), prior CVA now presenting with LLQ abdominal pain x 2 days. Pt states that early yesterday he began having sharp 8/10 intermittent LLQ abdominal pain, non radiating worse in certain positions or when raising his L leg. He denies any nausea, vomiting, diarrhea or constipation, no  symptoms. No fevers or chills, no HA, focal weakness or numbness, no change in vision.  Pt was admitted to surgery service yesterday for similar complaint. Per surgery, low suspicion for appendicitis at the time so pt was discharged after tolerating PO meal. He returned to the ED today for persistent abdominal pain    In ED pt was given 1L NS  VS:  132/58  87  99.5  16  100% on RA

## 2019-06-04 NOTE — ED PROVIDER NOTE - PROGRESS NOTE DETAILS
Surg feels this is not appy and rec medicine admit for further eval. Surg feels this is not appy and recc medicine admit for further eval. Also recc NeuroSx eval. Surg feels this is not appy, and no surgical intervention needed (written c/s pending) and [facility] medicine admit for further eval. Also [facility] NeuroSx eval. Seen by Neurosx, reccs pending. Accepted to IM admission.

## 2019-06-04 NOTE — CONSULT NOTE ADULT - SUBJECTIVE AND OBJECTIVE BOX
CC: 43y old Male admitted with a chief complaint of abdominal pain, now    HPI: 43M hx of congenital hydrocephalus s/p  shunt, b/l inguinal hernia repairs who presents with 2 days of abdominal pain. He came to the ED yesterday evening with RLQ pain. He slept a bit more but woke up with LLQ pain. He was scanned in the ED yesterday with questionable early appendicitis, however exam was benign and not consistent with appendicitis, as well as normal WBC.  He tolerated PO and left the hospital.  He returned later today with continued LLQ pain.  He still denies fever, chills, nausea/vomiting, diarrhea.     Of note, his  shunt, was placed originally when he was an infant, and the abdominal portion was last revised when he was 11. He last had the intracranial portion ligated in April.    PMHx: Hydrocephalus  Stroke  Essential hypertension    PSHx: Bilateral inguinal hernia  History of creation of ventriculoperitoneal shunt  S/P  shunt    Medications (inpatient):   Medications (PRN):  Allergies: No Known Allergies  (Intolerances: )  Social Hx:   Family Hx: Family history of hypertension in brother (Sibling)  Family history of hypertension: mother and father      Physical Exam  T(C): 37.5  HR: 87 (87 - 100)  BP: 132/58 (131/76 - 160/90)  RR: 16 (16 - 18)  SpO2: 100% (100% - 100%)  Tmax: T(C): , Max: 37.5 (19 @ 12:36)    General: well developed, well nourished, NAD  Neuro: alert and oriented, no focal deficits, moves all extremities spontaneously  HEENT: NCAT, EOMI, anicteric, mucosa moist  Respiratory: airway patent, respirations unlabored  CVS: regular rate and rhythm  Abdomen: soft, nondistended, minimally tender LLQ  Extremities: no edema, sensation and movement grossly intact  Skin: warm, dry, appropriate color    Labs:                        14.7   6.18  )-----------( 297      ( 2019 12:50 )             44.7       -    141  |  100  |  15  ----------------------------<  111<H>  3.9   |  27  |  1.06    Ca    9.9      2019 12:50    TPro  8.7<H>  /  Alb  4.6  /  TBili  0.3  /  DBili  x   /  AST  47<H>  /  ALT  71<H>  /  AlkPhos  159<H>  06-    Urinalysis Basic - ( 2019 16:30 )    Color: LIGHT YELLOW / Appearance: CLEAR / S.020 / pH: 6.5  Gluc: NEGATIVE / Ketone: NEGATIVE  / Bili: NEGATIVE / Urobili: NORMAL   Blood: NEGATIVE / Protein: 10 / Nitrite: NEGATIVE   Leuk Esterase: NEGATIVE / RBC: x / WBC x   Sq Epi: x / Non Sq Epi: x / Bacteria: x            Imaging and other studies:  < from: CT Abdomen and Pelvis w/ IV Cont (19 @ 18:17) >  IMPRESSION:     Findings which may suggest early appendicitis.    No fluid collection or abscess involving the ventriculoperitoneal shunt   catheter.    < end of copied text >

## 2019-06-04 NOTE — CONSULT NOTE ADULT - SUBJECTIVE AND OBJECTIVE BOX
HPI:  43M with PMH of Hydrocephalus, VPS which was ligated and ETv was done 3/25/19, he developed a fluid collection and VPS was then religated 19 who p/w persistent abd pain.  Seen in this ED yesterday for same, CT possible early appy, and admitted to surgery.  States he was able to tolerate PO overnight and was dc'd by surgery team.  Reports pain is unchanged, and severe. No further vomiting but hasn't really eaten much today.  No fever/chills.    Vital Signs Last 24 Hrs  T(C): 37.2 (2019 23:19), Max: 37.3 (2019 16:58)  T(F): 99 (2019 23:19), Max: 99.1 (2019 16:58)  HR: 100 (2019 23:19) (88 - 100)  BP: 160/90 (2019 23:19) (139/81 - 160/90)  BP(mean): --  RR: 16 (2019 23:19) (16 - 18)  SpO2: 100% (2019 23:19) (98% - 100%) (2019 01:07)    PAST MEDICAL & SURGICAL HISTORY:  Hydrocephalus  Stroke  Essential hypertension  Bilateral inguinal hernia  History of creation of ventriculoperitoneal shunt: at couple months old in [place] - left shunt currently not working  S/P  shunt: at 11 yrs old at that time patient had stroke    Allergies    No Known Allergies    Intolerances      acetaminophen   Tablet .. 650 milliGRAM(s) Oral every 6 hours PRN  enoxaparin Injectable 40 milliGRAM(s) SubCutaneous daily    SOCIAL HISTORY:  FAMILY HISTORY:  Family history of hypertension in brother (Sibling)  Family history of hypertension: mother and father    Vital Signs Last 24 Hrs  T(C): 37.5 (2019 12:36), Max: 37.5 (2019 12:36)  T(F): 99.5 (2019 12:36), Max: 99.5 (2019 12:36)  HR: 87 (2019 15:15) (87 - 100)  BP: 132/58 (2019 15:15) (131/76 - 160/90)  BP(mean): --  RR: 16 (2019 15:15) (16 - 18)  SpO2: 100% (2019 15:15) (100% - 100%)    PHYSICAL EXAM:  AAO X 3  PERRLA with left eye dysconjugate gaze  CN 2-12 otherwise grossly intact  SCHRADER strength 5/5 with left hand contracture  SILT    LABS:                          14.7   6.18  )-----------( 297      ( 2019 12:50 )             44.7     06-04    141  |  100  |  15  ----------------------------<  111<H>  3.9   |  27  |  1.06    Ca    9.9      2019 12:50    TPro  8.7<H>  /  Alb  4.6  /  TBili  0.3  /  DBili  x   /  AST  47<H>  /  ALT  71<H>  /  AlkPhos  159<H>  06-04      Urinalysis Basic - ( 2019 16:30 )    Color: LIGHT YELLOW / Appearance: CLEAR / S.020 / pH: 6.5  Gluc: NEGATIVE / Ketone: NEGATIVE  / Bili: NEGATIVE / Urobili: NORMAL   Blood: NEGATIVE / Protein: 10 / Nitrite: NEGATIVE   Leuk Esterase: NEGATIVE / RBC: x / WBC x   Sq Epi: x / Non Sq Epi: x / Bacteria: x        RADIOLOGY & ADDITIONAL STUDIES:
HPI:  43M with PMH of Hydrocephalus, VPS which was ligated and ETv was done 3/25/19, he developed a fluid collection and VPS was then religated 19 who p/w persistent abd pain.  Seen in this ED yesterday for same, CT possible early appy, and admitted to surgery.  States he was able to tolerate PO overnight and was dc'd by surgery team.  Reports pain is unchanged, and severe. No further vomiting but hasn't really eaten much today.  No fever/chills.    Vital Signs Last 24 Hrs  T(C): 37.2 (2019 23:19), Max: 37.3 (2019 16:58)  T(F): 99 (2019 23:19), Max: 99.1 (2019 16:58)  HR: 100 (2019 23:19) (88 - 100)  BP: 160/90 (2019 23:19) (139/81 - 160/90)  BP(mean): --  RR: 16 (2019 23:19) (16 - 18)  SpO2: 100% (2019 23:19) (98% - 100%) (2019 01:07)    PAST MEDICAL & SURGICAL HISTORY:  Hydrocephalus  Stroke  Essential hypertension  Bilateral inguinal hernia  History of creation of ventriculoperitoneal shunt: at couple months old in [place] - left shunt currently not working  S/P  shunt: at 11 yrs old at that time patient had stroke    Allergies    No Known Allergies    Intolerances      acetaminophen   Tablet .. 650 milliGRAM(s) Oral every 6 hours PRN  enoxaparin Injectable 40 milliGRAM(s) SubCutaneous daily    SOCIAL HISTORY:  FAMILY HISTORY:  Family history of hypertension in brother (Sibling)  Family history of hypertension: mother and father    Vital Signs Last 24 Hrs  T(C): 37.5 (2019 12:36), Max: 37.5 (2019 12:36)  T(F): 99.5 (2019 12:36), Max: 99.5 (2019 12:36)  HR: 87 (2019 15:15) (87 - 100)  BP: 132/58 (2019 15:15) (131/76 - 160/90)  BP(mean): --  RR: 16 (2019 15:15) (16 - 18)  SpO2: 100% (2019 15:15) (100% - 100%)    PHYSICAL EXAM:  AAO X 3  PERRLA with left eye dysconjugate gaze  CN 2-12 otherwise grossly intact  SCHRADER strength 5/5 with left hand contracture  SILT    LABS:                          14.7   6.18  )-----------( 297      ( 2019 12:50 )             44.7     06-04    141  |  100  |  15  ----------------------------<  111<H>  3.9   |  27  |  1.06    Ca    9.9      2019 12:50    TPro  8.7<H>  /  Alb  4.6  /  TBili  0.3  /  DBili  x   /  AST  47<H>  /  ALT  71<H>  /  AlkPhos  159<H>  06-04      Urinalysis Basic - ( 2019 16:30 )    Color: LIGHT YELLOW / Appearance: CLEAR / S.020 / pH: 6.5  Gluc: NEGATIVE / Ketone: NEGATIVE  / Bili: NEGATIVE / Urobili: NORMAL   Blood: NEGATIVE / Protein: 10 / Nitrite: NEGATIVE   Leuk Esterase: NEGATIVE / RBC: x / WBC x   Sq Epi: x / Non Sq Epi: x / Bacteria: x        RADIOLOGY & ADDITIONAL STUDIES:

## 2019-06-04 NOTE — ED PROVIDER NOTE - PSH
Bilateral inguinal hernia    History of creation of ventriculoperitoneal shunt  at couple months old in [place] - left shunt currently not working  S/P  shunt  at 11 yrs old at that time patient had stroke

## 2019-06-04 NOTE — ED PROVIDER NOTE - CLINICAL SUMMARY MEDICAL DECISION MAKING FREE TEXT BOX
43 yr old M c abd pain, persistent.  YEsterday's CT showed possible early appy.  DC'd from surgery svc overnight. Will send labs, defer imaging for now. Surgical eval. Possible readmit.

## 2019-06-04 NOTE — H&P ADULT - NSHPLABSRESULTS_GEN_ALL_CORE
.  LABS:                         14.7   6.18  )-----------( 297      ( 2019 12:50 )             44.7     -    141  |  100  |  15  ----------------------------<  111<H>  3.9   |  27  |  1.06    Ca    9.9      2019 12:50    TPro  8.7<H>  /  Alb  4.6  /  TBili  0.3  /  DBili  x   /  AST  47<H>  /  ALT  71<H>  /  AlkPhos  159<H>        Urinalysis Basic - ( 2019 16:30 )    Color: LIGHT YELLOW / Appearance: CLEAR / S.020 / pH: 6.5  Gluc: NEGATIVE / Ketone: NEGATIVE  / Bili: NEGATIVE / Urobili: NORMAL   Blood: NEGATIVE / Protein: 10 / Nitrite: NEGATIVE   Leuk Esterase: NEGATIVE / RBC: x / WBC x   Sq Epi: x / Non Sq Epi: x / Bacteria: x          RADIOLOGY, EKG & ADDITIONAL TESTS: Reviewed.

## 2019-06-04 NOTE — ED ADULT TRIAGE NOTE - CHIEF COMPLAINT QUOTE
pt comes to ED for abdominal pain. pt was seen at [facility] yesterday and d/c ed pt was seen by surgery team for possible appendicitis . pt is coming back for worsening pain. pt deneis NVD pt VSS pt appears comfortable NAD

## 2019-06-05 LAB
ALBUMIN SERPL ELPH-MCNC: 3.9 G/DL — SIGNIFICANT CHANGE UP (ref 3.3–5)
ALP SERPL-CCNC: 137 U/L — HIGH (ref 40–120)
ALT FLD-CCNC: 60 U/L — HIGH (ref 4–41)
ANION GAP SERPL CALC-SCNC: 14 MMO/L — SIGNIFICANT CHANGE UP (ref 7–14)
AST SERPL-CCNC: 36 U/L — SIGNIFICANT CHANGE UP (ref 4–40)
BASOPHILS # BLD AUTO: 0.03 K/UL — SIGNIFICANT CHANGE UP (ref 0–0.2)
BASOPHILS NFR BLD AUTO: 0.6 % — SIGNIFICANT CHANGE UP (ref 0–2)
BILIRUB SERPL-MCNC: 0.3 MG/DL — SIGNIFICANT CHANGE UP (ref 0.2–1.2)
BUN SERPL-MCNC: 13 MG/DL — SIGNIFICANT CHANGE UP (ref 7–23)
CALCIUM SERPL-MCNC: 9.6 MG/DL — SIGNIFICANT CHANGE UP (ref 8.4–10.5)
CHLORIDE SERPL-SCNC: 103 MMOL/L — SIGNIFICANT CHANGE UP (ref 98–107)
CO2 SERPL-SCNC: 24 MMOL/L — SIGNIFICANT CHANGE UP (ref 22–31)
CREAT SERPL-MCNC: 1.02 MG/DL — SIGNIFICANT CHANGE UP (ref 0.5–1.3)
EOSINOPHIL # BLD AUTO: 0.4 K/UL — SIGNIFICANT CHANGE UP (ref 0–0.5)
EOSINOPHIL NFR BLD AUTO: 8.1 % — HIGH (ref 0–6)
GLUCOSE SERPL-MCNC: 107 MG/DL — HIGH (ref 70–99)
HCT VFR BLD CALC: 39.6 % — SIGNIFICANT CHANGE UP (ref 39–50)
HGB BLD-MCNC: 12.8 G/DL — LOW (ref 13–17)
IMM GRANULOCYTES NFR BLD AUTO: 0.4 % — SIGNIFICANT CHANGE UP (ref 0–1.5)
LYMPHOCYTES # BLD AUTO: 1.94 K/UL — SIGNIFICANT CHANGE UP (ref 1–3.3)
LYMPHOCYTES # BLD AUTO: 39.2 % — SIGNIFICANT CHANGE UP (ref 13–44)
MAGNESIUM SERPL-MCNC: 2.2 MG/DL — SIGNIFICANT CHANGE UP (ref 1.6–2.6)
MCHC RBC-ENTMCNC: 28.6 PG — SIGNIFICANT CHANGE UP (ref 27–34)
MCHC RBC-ENTMCNC: 32.3 % — SIGNIFICANT CHANGE UP (ref 32–36)
MCV RBC AUTO: 88.6 FL — SIGNIFICANT CHANGE UP (ref 80–100)
MONOCYTES # BLD AUTO: 0.56 K/UL — SIGNIFICANT CHANGE UP (ref 0–0.9)
MONOCYTES NFR BLD AUTO: 11.3 % — SIGNIFICANT CHANGE UP (ref 2–14)
NEUTROPHILS # BLD AUTO: 2 K/UL — SIGNIFICANT CHANGE UP (ref 1.8–7.4)
NEUTROPHILS NFR BLD AUTO: 40.4 % — LOW (ref 43–77)
NRBC # FLD: 0 K/UL — SIGNIFICANT CHANGE UP (ref 0–0)
PHOSPHATE SERPL-MCNC: 4.2 MG/DL — SIGNIFICANT CHANGE UP (ref 2.5–4.5)
PLATELET # BLD AUTO: 282 K/UL — SIGNIFICANT CHANGE UP (ref 150–400)
PMV BLD: 10.4 FL — SIGNIFICANT CHANGE UP (ref 7–13)
POTASSIUM SERPL-MCNC: 4 MMOL/L — SIGNIFICANT CHANGE UP (ref 3.5–5.3)
POTASSIUM SERPL-SCNC: 4 MMOL/L — SIGNIFICANT CHANGE UP (ref 3.5–5.3)
PROT SERPL-MCNC: 7.4 G/DL — SIGNIFICANT CHANGE UP (ref 6–8.3)
RBC # BLD: 4.47 M/UL — SIGNIFICANT CHANGE UP (ref 4.2–5.8)
RBC # FLD: 12.5 % — SIGNIFICANT CHANGE UP (ref 10.3–14.5)
SODIUM SERPL-SCNC: 141 MMOL/L — SIGNIFICANT CHANGE UP (ref 135–145)
WBC # BLD: 4.95 K/UL — SIGNIFICANT CHANGE UP (ref 3.8–10.5)
WBC # FLD AUTO: 4.95 K/UL — SIGNIFICANT CHANGE UP (ref 3.8–10.5)

## 2019-06-05 PROCEDURE — 99233 SBSQ HOSP IP/OBS HIGH 50: CPT

## 2019-06-05 PROCEDURE — 99221 1ST HOSP IP/OBS SF/LOW 40: CPT

## 2019-06-05 PROCEDURE — 70551 MRI BRAIN STEM W/O DYE: CPT | Mod: 26

## 2019-06-05 RX ORDER — LISINOPRIL 2.5 MG/1
1 TABLET ORAL
Qty: 0 | Refills: 0 | DISCHARGE

## 2019-06-05 RX ADMIN — Medication 10 MILLIGRAM(S): at 05:35

## 2019-06-05 RX ADMIN — Medication 650 MILLIGRAM(S): at 00:30

## 2019-06-05 NOTE — PROGRESS NOTE ADULT - PROBLEM SELECTOR PLAN 1
- Etiology unclear. Pt evaluated by surgery service. Low suspicion for appendicitis. No surgical intervention at this time  - Unclear if this is related to  shunt. Per neurosx, this is less likely- MRI was ordered, will f/u  - Pt declined taking pain meds for now

## 2019-06-05 NOTE — CHART NOTE - NSCHARTNOTEFT_GEN_A_CORE
Discussed MRI of the brain with CSF flow results with neurosurgery team: The ventricular system is stable in size. The third ventriculostomy appears patent. A persistent scalp fluid collection is noted surrounding the extracranial portion of the shunt tubing. As per neurosurgery no further intervention at this time.

## 2019-06-05 NOTE — PROGRESS NOTE ADULT - SUBJECTIVE AND OBJECTIVE BOX
Patient is a 43y old  Male who presents with a chief complaint of Abdominal pain (2019 22:04)      SUBJECTIVE / OVERNIGHT EVENTS: patient seen and examined by bedside, abdominal pain improved but still there in left lower abdomen , denies headache, dizziness, SOB, CP, Palpitations , N/V/D,         MEDICATIONS  (STANDING):  enalapril 10 milliGRAM(s) Oral daily    MEDICATIONS  (PRN):  acetaminophen   Tablet .. 650 milliGRAM(s) Oral every 6 hours PRN Moderate Pain (4 - 6)      Vital Signs Last 24 Hrs  T(C): 36.4 (2019 05:34), Max: 37.8 (2019 20:59)  T(F): 97.6 (2019 05:34), Max: 100 (2019 20:59)  HR: 82 (2019 05:34) (82 - 105)  BP: 127/79 (2019 05:34) (127/79 - 152/80)  BP(mean): --  RR: 16 (2019 05:34) (16 - 17)  SpO2: 94% (2019 05:34) (94% - 100%)  CAPILLARY BLOOD GLUCOSE        I&O's Summary      PHYSICAL EXAM:  GENERAL: NAD, well-developed  HEAD:  Atraumatic, Normocephalic  EYES: EOMI, PERRLA, conjunctiva and sclera clear  NECK: Supple, No JVD  CHEST/LUNG: Clear to auscultation bilaterally; No wheeze  HEART: Regular rate and rhythm; No murmurs, rubs, or gallops  ABDOMEN: Soft, mild tenderness in LLQ, no guarding or rigidity , Nondistended; Bowel sounds present  EXTREMITIES:  2+ Peripheral Pulses, No clubbing, cyanosis, or edema  PSYCH: AAOx3  NEUROLOGY: non-focal  SKIN: No rashes or lesions    LABS:                        12.8   4.95  )-----------( 282      ( 2019 06:21 )             39.6     06-05    141  |  103  |  13  ----------------------------<  107<H>  4.0   |  24  |  1.02    Ca    9.6      2019 06:21  Phos  4.2     06-05  Mg     2.2     06-05    TPro  7.4  /  Alb  3.9  /  TBili  0.3  /  DBili  x   /  AST  36  /  ALT  60<H>  /  AlkPhos  137<H>            Urinalysis Basic - ( 2019 16:30 )    Color: LIGHT YELLOW / Appearance: CLEAR / S.020 / pH: 6.5  Gluc: NEGATIVE / Ketone: NEGATIVE  / Bili: NEGATIVE / Urobili: NORMAL   Blood: NEGATIVE / Protein: 10 / Nitrite: NEGATIVE   Leuk Esterase: NEGATIVE / RBC: x / WBC x   Sq Epi: x / Non Sq Epi: x / Bacteria: x        RADIOLOGY & ADDITIONAL TESTS:    Imaging Personally Reviewed:    Consultant(s) Notes Reviewed: SX      Care Discussed with Consultants/Other Providers:

## 2019-06-05 NOTE — PROGRESS NOTE ADULT - ASSESSMENT
42 yo M with h/o congenital hydrocephalus s/p  shunt and recent revisions (3/18/19 and 4/29/19), prior CVA now presenting with LLQ abdominal pain x 2 days.

## 2019-06-06 ENCOUNTER — TRANSCRIPTION ENCOUNTER (OUTPATIENT)
Age: 44
End: 2019-06-06

## 2019-06-06 VITALS
HEART RATE: 91 BPM | TEMPERATURE: 99 F | RESPIRATION RATE: 16 BRPM | SYSTOLIC BLOOD PRESSURE: 133 MMHG | OXYGEN SATURATION: 96 % | DIASTOLIC BLOOD PRESSURE: 92 MMHG

## 2019-06-06 LAB
ALBUMIN SERPL ELPH-MCNC: 4 G/DL — SIGNIFICANT CHANGE UP (ref 3.3–5)
ALP SERPL-CCNC: 143 U/L — HIGH (ref 40–120)
ALT FLD-CCNC: 67 U/L — HIGH (ref 4–41)
ANION GAP SERPL CALC-SCNC: 13 MMO/L — SIGNIFICANT CHANGE UP (ref 7–14)
AST SERPL-CCNC: 32 U/L — SIGNIFICANT CHANGE UP (ref 4–40)
BASOPHILS # BLD AUTO: 0.03 K/UL — SIGNIFICANT CHANGE UP (ref 0–0.2)
BASOPHILS NFR BLD AUTO: 0.6 % — SIGNIFICANT CHANGE UP (ref 0–2)
BILIRUB SERPL-MCNC: 0.3 MG/DL — SIGNIFICANT CHANGE UP (ref 0.2–1.2)
BUN SERPL-MCNC: 13 MG/DL — SIGNIFICANT CHANGE UP (ref 7–23)
CALCIUM SERPL-MCNC: 9.4 MG/DL — SIGNIFICANT CHANGE UP (ref 8.4–10.5)
CHLORIDE SERPL-SCNC: 104 MMOL/L — SIGNIFICANT CHANGE UP (ref 98–107)
CO2 SERPL-SCNC: 23 MMOL/L — SIGNIFICANT CHANGE UP (ref 22–31)
CREAT SERPL-MCNC: 1.01 MG/DL — SIGNIFICANT CHANGE UP (ref 0.5–1.3)
EOSINOPHIL # BLD AUTO: 0.44 K/UL — SIGNIFICANT CHANGE UP (ref 0–0.5)
EOSINOPHIL NFR BLD AUTO: 8.3 % — HIGH (ref 0–6)
GLUCOSE SERPL-MCNC: 96 MG/DL — SIGNIFICANT CHANGE UP (ref 70–99)
HCT VFR BLD CALC: 39.9 % — SIGNIFICANT CHANGE UP (ref 39–50)
HGB BLD-MCNC: 13.3 G/DL — SIGNIFICANT CHANGE UP (ref 13–17)
IMM GRANULOCYTES NFR BLD AUTO: 0.2 % — SIGNIFICANT CHANGE UP (ref 0–1.5)
LYMPHOCYTES # BLD AUTO: 2.06 K/UL — SIGNIFICANT CHANGE UP (ref 1–3.3)
LYMPHOCYTES # BLD AUTO: 38.9 % — SIGNIFICANT CHANGE UP (ref 13–44)
MAGNESIUM SERPL-MCNC: 2.2 MG/DL — SIGNIFICANT CHANGE UP (ref 1.6–2.6)
MCHC RBC-ENTMCNC: 28.9 PG — SIGNIFICANT CHANGE UP (ref 27–34)
MCHC RBC-ENTMCNC: 33.3 % — SIGNIFICANT CHANGE UP (ref 32–36)
MCV RBC AUTO: 86.7 FL — SIGNIFICANT CHANGE UP (ref 80–100)
MONOCYTES # BLD AUTO: 0.39 K/UL — SIGNIFICANT CHANGE UP (ref 0–0.9)
MONOCYTES NFR BLD AUTO: 7.4 % — SIGNIFICANT CHANGE UP (ref 2–14)
NEUTROPHILS # BLD AUTO: 2.37 K/UL — SIGNIFICANT CHANGE UP (ref 1.8–7.4)
NEUTROPHILS NFR BLD AUTO: 44.6 % — SIGNIFICANT CHANGE UP (ref 43–77)
NRBC # FLD: 0 K/UL — SIGNIFICANT CHANGE UP (ref 0–0)
PHOSPHATE SERPL-MCNC: 4 MG/DL — SIGNIFICANT CHANGE UP (ref 2.5–4.5)
PLATELET # BLD AUTO: 273 K/UL — SIGNIFICANT CHANGE UP (ref 150–400)
PMV BLD: 10 FL — SIGNIFICANT CHANGE UP (ref 7–13)
POTASSIUM SERPL-MCNC: 3.6 MMOL/L — SIGNIFICANT CHANGE UP (ref 3.5–5.3)
POTASSIUM SERPL-SCNC: 3.6 MMOL/L — SIGNIFICANT CHANGE UP (ref 3.5–5.3)
PROT SERPL-MCNC: 7.4 G/DL — SIGNIFICANT CHANGE UP (ref 6–8.3)
RBC # BLD: 4.6 M/UL — SIGNIFICANT CHANGE UP (ref 4.2–5.8)
RBC # FLD: 12.4 % — SIGNIFICANT CHANGE UP (ref 10.3–14.5)
SODIUM SERPL-SCNC: 140 MMOL/L — SIGNIFICANT CHANGE UP (ref 135–145)
WBC # BLD: 5.3 K/UL — SIGNIFICANT CHANGE UP (ref 3.8–10.5)
WBC # FLD AUTO: 5.3 K/UL — SIGNIFICANT CHANGE UP (ref 3.8–10.5)

## 2019-06-06 PROCEDURE — 99239 HOSP IP/OBS DSCHRG MGMT >30: CPT

## 2019-06-06 RX ORDER — ACETAMINOPHEN 500 MG
2 TABLET ORAL
Qty: 0 | Refills: 0 | DISCHARGE
Start: 2019-06-06

## 2019-06-06 RX ADMIN — Medication 10 MILLIGRAM(S): at 05:06

## 2019-06-06 NOTE — DISCHARGE NOTE PROVIDER - CARE PROVIDERS DIRECT ADDRESSES
,[email or web address]_Unknown ,[email or web address],DirectAddress_Unknown,[email or web address]

## 2019-06-06 NOTE — CHART NOTE - NSCHARTNOTEFT_GEN_A_CORE
d/w sx ok for pt to be discharged.  Pain improved. bowel sounds x 4 .  d/w medical attending dr. ritter

## 2019-06-06 NOTE — CHART NOTE - NSCHARTNOTEFT_GEN_A_CORE
d/w neuro sx  shunt to be removed as an outpt  d/w sx [id number] regarding pts abdominal pain and mri imaging they requested is resulted.  will see pt.  will follow recs

## 2019-06-06 NOTE — DISCHARGE NOTE PROVIDER - PROVIDER TOKENS
PROVIDER:[TOKEN:[[id number]]],PROVIDER:[TOKEN:[[id number]]] PROVIDER:[TOKEN:[[id number]]],PROVIDER:[TOKEN:[[id number]]],PROVIDER:[TOKEN:[[id number]]]

## 2019-06-06 NOTE — PROGRESS NOTE ADULT - PROBLEM SELECTOR PLAN 3
- s/p  shunt and recent revision   - Neurosurgery recs appreciated. f/u MRI brain with CSF flow - s/p  shunt and recent revision   - Neurosurgery recs appreciated. MRI brain with CSF flow noted as above, plan to remove shunt as outpt

## 2019-06-06 NOTE — DISCHARGE NOTE PROVIDER - NSDCCPCAREPLAN_GEN_ALL_CORE_FT
PRINCIPAL DISCHARGE DIAGNOSIS  Diagnosis: Abdominal pain  Assessment and Plan of Treatment: Please follow up with your pcp within 1 week of discharge.  Please call to make  an appointment within 1 week of discharge.   -CT abdomen and pelvis:Findings which may suggest early appendicitis.  No fluid collection or abscess involving the ventriculoperitoneal shunt   catheter.  - Etiology unclear. Pt evaluated by surgery service. Low suspicion for appendicitis. No surgical intervention at this time  - Pt declined taking pain meds for now.  - Discussed MRI of the brain with CSF flow results with neurosurgery team: The ventricular system is stable in size. The third ventriculostomy appears patent. A persistent scalp fluid collection is noted surrounding the extracranial portion of the shunt tubing. As per neurosurgery no further intervention at this time.  -pain has improved   -pt can follow up with Surgeon Dr. Kelley         SECONDARY DISCHARGE DIAGNOSES  Diagnosis: Hydrocephalus, unspecified type  Assessment and Plan of Treatment: Please follow up with your neurosurgeon Dr. Saldana for  shunt removal.  -Neuro Surgery following    - s/p  shunt and recent revision   -- Discussed MRI of the brain with CSF flow results with neurosurgery team: The ventricular system is stable in size. The third ventriculostomy appears patent. A persistent scalp fluid collection is noted surrounding the extracranial portion of the shunt tubing. As per neurosurgery no further intervention at this time.    Diagnosis: Essential hypertension  Assessment and Plan of Treatment: Please follow up with your pcp within 1 week of discharge.  Please call to make an appointment within 1 week of discharge.  Please continue your blood pressure as prescribed.  Please check your blood pressure prior to taking your blood pressure medications

## 2019-06-06 NOTE — DISCHARGE NOTE NURSING/CASE MANAGEMENT/SOCIAL WORK - NSDCDPATPORTLINK_GEN_ALL_CORE
You can access the [facility] Patient Portal, offered by [facility], by registering with the following website: [email or web address]

## 2019-06-06 NOTE — DISCHARGE NOTE PROVIDER - HOSPITAL COURSE
44 yo M with h/o congenital hydrocephalus s/p  shunt and recent revisions (3/18/19 and 4/29/19), prior CVA now presenting with LLQ abdominal pain x 2 days. 42 yo M with h/o congenital hydrocephalus s/p  shunt and recent revisions (3/18/19 and 4/29/19), prior CVA now presenting with LLQ abdominal pain x 2 days.          Left lower quadrant pain    -CT abdomen and pelvis:Findings which may suggest early appendicitis.    No fluid collection or abscess involving the ventriculoperitoneal shunt     catheter.    - Etiology unclear. Pt evaluated by surgery service. Low suspicion for appendicitis. No surgical intervention at this time    - Pt declined taking pain meds for now.    - Discussed MRI of the brain with CSF flow results with neurosurgery team: The ventricular system is stable in size. The third ventriculostomy appears patent. A persistent scalp fluid collection is noted surrounding the extracranial portion of the shunt tubing. As per neurosurgery no further intervention at this time.    -pain has improved     -pt can follow up with Surgeon Dr. Kelley         Essential hypertension    - Monitor BP    - Continue home meds.         Hydrocephalus    - s/p  shunt and recent revision     - Neurosurgery recs appreciated    - MRI brain with CSF flow:The ventricular system is stable in size. The third ventriculostomy     appears patent. A persistent scalp fluid collection is noted surrounding     the extracranial portion of the shunt tubing.     - s/p  shunt and recent revision     -- Discussed MRI of the brain with CSF flow results with neurosurgery team: The ventricular system is stable in size. The third ventriculostomy appears patent. A persistent scalp fluid collection is noted surrounding the extracranial portion of the shunt tubing. As per neurosurgery no further intervention at this time.    -pt to follow up with Dr. Saldana for  shunt removal         Dispo: Home

## 2019-06-06 NOTE — PROGRESS NOTE ADULT - SUBJECTIVE AND OBJECTIVE BOX
Patient is a 43y old  Male who presents with a chief complaint of Abdominal pain (05 Jun 2019 14:31)      SUBJECTIVE / OVERNIGHT EVENTS:    MEDICATIONS  (STANDING):  enalapril 10 milliGRAM(s) Oral daily    MEDICATIONS  (PRN):  acetaminophen   Tablet .. 650 milliGRAM(s) Oral every 6 hours PRN Moderate Pain (4 - 6)      Vital Signs Last 24 Hrs  T(C): 36.6 (06 Jun 2019 05:05), Max: 37.2 (05 Jun 2019 21:17)  T(F): 97.9 (06 Jun 2019 05:05), Max: 99 (05 Jun 2019 21:17)  HR: 71 (06 Jun 2019 05:05) (71 - 95)  BP: 132/91 (06 Jun 2019 05:05) (125/85 - 141/81)  BP(mean): --  RR: 16 (06 Jun 2019 05:05) (16 - 16)  SpO2: 98% (06 Jun 2019 05:05) (95% - 98%)  CAPILLARY BLOOD GLUCOSE        I&O's Summary      PHYSICAL EXAM:  GENERAL: NAD, well-developed  HEAD:  Atraumatic, Normocephalic  EYES: EOMI, PERRLA, conjunctiva and sclera clear  NECK: Supple, No JVD  CHEST/LUNG: Clear to auscultation bilaterally; No wheeze  HEART: Regular rate and rhythm; No murmurs, rubs, or gallops  ABDOMEN: Soft, mild tenderness in LLQ, no guarding or rigidity , Nondistended; Bowel sounds present  EXTREMITIES:  2+ Peripheral Pulses, No clubbing, cyanosis, or edema  PSYCH: AAOx3  NEUROLOGY: non-focal  SKIN: No rashes or lesions    LABS:                        13.3   5.30  )-----------( 273      ( 06 Jun 2019 05:42 )             39.9     06-06    140  |  104  |  13  ----------------------------<  96  3.6   |  23  |  1.01    Ca    9.4      06 Jun 2019 05:42  Phos  4.0     06-06  Mg     2.2     06-06    TPro  7.4  /  Alb  4.0  /  TBili  0.3  /  DBili  x   /  AST  32  /  ALT  67<H>  /  AlkPhos  143<H>  06-06              RADIOLOGY & ADDITIONAL TESTS:    < from: MR Head w/ CSF Flow, No Cont (06.05.19 @ 14:57) >  Sagittal T1, axial T1, T2, FLAIR, SWI, and diffusion-weighted series with   ADC maps were performed. A CSF flow study was performed on the sagittal   cine midline series.    A right frontal approach shunt catheter is again noted. The ventricular   system is stable in size. There is no transependymal flow of cerebral   spinal fluid. On the CSF flow study, the third ventriculostomy appears   patent with intact CSFpulsations noted at the level of the   ventriculostomy defect. A nonspecific scalp fluid collection embedded in   the temporalis muscle surrounding the extracranial component shunt   catheter is grossly stable in size. Correlate for possible defect or leak   in the shunt tubing.    Right corona radiata encephalomalacia is again noted with associated ex   vacuo dilatation of the right lateral ventricle. Right frontal lobe   encephalomalacia surrounds the shunt catheter. There is no evidence for   acute infarct or acute hemorrhage.    IMPRESSION:    The ventricular system is stable in size. The third ventriculostomy   appears patent. A persistent scalp fluid collection is noted surrounding   the extracranial portion of the shunt tubing.        < end of copied text >  Imaging Personally Reviewed:    Consultant(s) Notes Reviewed:      Care Discussed with Consultants/Other Providers: Patient is a 43y old  Male who presents with a chief complaint of Abdominal pain (05 Jun 2019 14:31)      SUBJECTIVE / OVERNIGHT EVENTS: patient seen and examined by bedside, abdominal pain has improved but still there in LLQ , denies headache, dizziness, SOB, CP, Palpitations , N/V/D,       MEDICATIONS  (STANDING):  enalapril 10 milliGRAM(s) Oral daily    MEDICATIONS  (PRN):  acetaminophen   Tablet .. 650 milliGRAM(s) Oral every 6 hours PRN Moderate Pain (4 - 6)      Vital Signs Last 24 Hrs  T(C): 36.6 (06 Jun 2019 05:05), Max: 37.2 (05 Jun 2019 21:17)  T(F): 97.9 (06 Jun 2019 05:05), Max: 99 (05 Jun 2019 21:17)  HR: 71 (06 Jun 2019 05:05) (71 - 95)  BP: 132/91 (06 Jun 2019 05:05) (125/85 - 141/81)  BP(mean): --  RR: 16 (06 Jun 2019 05:05) (16 - 16)  SpO2: 98% (06 Jun 2019 05:05) (95% - 98%)  CAPILLARY BLOOD GLUCOSE        I&O's Summary      PHYSICAL EXAM:  GENERAL: NAD, well-developed  HEAD:  Atraumatic, Normocephalic  EYES: EOMI, PERRLA, conjunctiva and sclera clear  NECK: Supple, No JVD  CHEST/LUNG: Clear to auscultation bilaterally; No wheeze  HEART: Regular rate and rhythm; No murmurs, rubs, or gallops  ABDOMEN: Soft, mild tenderness in LLQ, no guarding or rigidity , Nondistended; Bowel sounds present  EXTREMITIES:  2+ Peripheral Pulses, No clubbing, cyanosis, or edema  PSYCH: AAOx3  NEUROLOGY: non-focal  SKIN: No rashes or lesions    LABS:                        13.3   5.30  )-----------( 273      ( 06 Jun 2019 05:42 )             39.9     06-06    140  |  104  |  13  ----------------------------<  96  3.6   |  23  |  1.01    Ca    9.4      06 Jun 2019 05:42  Phos  4.0     06-06  Mg     2.2     06-06    TPro  7.4  /  Alb  4.0  /  TBili  0.3  /  DBili  x   /  AST  32  /  ALT  67<H>  /  AlkPhos  143<H>  06-06              RADIOLOGY & ADDITIONAL TESTS:    < from: MR Head w/ CSF Flow, No Cont (06.05.19 @ 14:57) >  Sagittal T1, axial T1, T2, FLAIR, SWI, and diffusion-weighted series with   ADC maps were performed. A CSF flow study was performed on the sagittal   cine midline series.    A right frontal approach shunt catheter is again noted. The ventricular   system is stable in size. There is no transependymal flow of cerebral   spinal fluid. On the CSF flow study, the third ventriculostomy appears   patent with intact CSFpulsations noted at the level of the   ventriculostomy defect. A nonspecific scalp fluid collection embedded in   the temporalis muscle surrounding the extracranial component shunt   catheter is grossly stable in size. Correlate for possible defect or leak   in the shunt tubing.    Right corona radiata encephalomalacia is again noted with associated ex   vacuo dilatation of the right lateral ventricle. Right frontal lobe   encephalomalacia surrounds the shunt catheter. There is no evidence for   acute infarct or acute hemorrhage.    IMPRESSION:    The ventricular system is stable in size. The third ventriculostomy   appears patent. A persistent scalp fluid collection is noted surrounding   the extracranial portion of the shunt tubing.        < end of copied text >  Imaging Personally Reviewed:    Consultant(s) Notes Reviewed:      Care Discussed with Consultants/Other Providers:

## 2019-06-06 NOTE — DISCHARGE NOTE PROVIDER - CARE PROVIDER_API CALL
Kristian Saldana)  Neurological Surgery; Pediatric Neurological Surgery  [address] [facility], [address]  [place], [place] [id number]  Phone: [phone]  Fax: [phone]  Follow Up Time:     Jess Hernandez)  Internal Medicine  [address] [facility], [address]  [place] [address]  Phone: [phone]  Fax: [phone]  Follow Up Time: Kristian Saldana)  Neurological Surgery; Pediatric Neurological Surgery  [address] [facility], [address]  [place], [place] [id number]  Phone: [phone]  Fax: [phone]  Follow Up Time:     Jess Hernandez)  Internal Medicine  [address] [place] [address], [address]  [place] [address]  Phone: [phone]  Fax: [phone]  Follow Up Time:     Helen Kelley)  Surgery  [address] [facility], [address]  [place] [address]  Phone: [phone]  Fax: [phone]  Follow Up Time:

## 2019-06-06 NOTE — PROGRESS NOTE ADULT - PROBLEM SELECTOR PLAN 1
- Etiology unclear. Pt evaluated by surgery service. Low suspicion for appendicitis. No surgical intervention at this time  - Unclear if this is related to  shunt. Per neurosx, this is less likely- MRI was ordered, will f/u  - Pt declined taking pain meds for now - Etiology unclear. Pt evaluated by surgery service. Low suspicion for appendicitis. No surgical intervention at this time  - Unclear if this is related to  shunt. Per neurosx, this is less likely- MRI noted as above, unlikely cause of Abd pain, case was d/w Neurosx by SIRIA Arguello, Shunt to be removed as outpt   Sx f/i requested as pt still c/o pain   - Pt declined taking pain meds for now

## 2019-08-12 ENCOUNTER — OUTPATIENT (OUTPATIENT)
Dept: OUTPATIENT SERVICES | Facility: HOSPITAL | Age: 44
LOS: 1 days | End: 2019-08-12
Payer: MEDICARE

## 2019-08-12 ENCOUNTER — APPOINTMENT (OUTPATIENT)
Dept: MRI IMAGING | Facility: CLINIC | Age: 44
End: 2019-08-12
Payer: MEDICARE

## 2019-08-12 DIAGNOSIS — G91.9 HYDROCEPHALUS, UNSPECIFIED: ICD-10-CM

## 2019-08-12 DIAGNOSIS — K40.20 BILATERAL INGUINAL HERNIA, WITHOUT OBSTRUCTION OR GANGRENE, NOT SPECIFIED AS RECURRENT: Chronic | ICD-10-CM

## 2019-08-12 DIAGNOSIS — Z92.89 PERSONAL HISTORY OF OTHER MEDICAL TREATMENT: Chronic | ICD-10-CM

## 2019-08-12 DIAGNOSIS — Z98.2 PRESENCE OF CEREBROSPINAL FLUID DRAINAGE DEVICE: Chronic | ICD-10-CM

## 2019-08-12 DIAGNOSIS — Z00.8 ENCOUNTER FOR OTHER GENERAL EXAMINATION: ICD-10-CM

## 2019-08-12 PROCEDURE — 70551 MRI BRAIN STEM W/O DYE: CPT

## 2019-08-12 PROCEDURE — 70551 MRI BRAIN STEM W/O DYE: CPT | Mod: 26

## 2019-08-23 ENCOUNTER — OUTPATIENT (OUTPATIENT)
Dept: OUTPATIENT SERVICES | Facility: HOSPITAL | Age: 44
LOS: 1 days | End: 2019-08-23

## 2019-08-23 VITALS
SYSTOLIC BLOOD PRESSURE: 140 MMHG | HEIGHT: 68 IN | TEMPERATURE: 97 F | HEART RATE: 72 BPM | WEIGHT: 179.9 LBS | DIASTOLIC BLOOD PRESSURE: 98 MMHG | RESPIRATION RATE: 16 BRPM

## 2019-08-23 DIAGNOSIS — Z98.2 PRESENCE OF CEREBROSPINAL FLUID DRAINAGE DEVICE: Chronic | ICD-10-CM

## 2019-08-23 DIAGNOSIS — G91.9 HYDROCEPHALUS, UNSPECIFIED: ICD-10-CM

## 2019-08-23 DIAGNOSIS — I10 ESSENTIAL (PRIMARY) HYPERTENSION: ICD-10-CM

## 2019-08-23 DIAGNOSIS — Z98.890 OTHER SPECIFIED POSTPROCEDURAL STATES: Chronic | ICD-10-CM

## 2019-08-23 DIAGNOSIS — Z92.89 PERSONAL HISTORY OF OTHER MEDICAL TREATMENT: Chronic | ICD-10-CM

## 2019-08-23 DIAGNOSIS — K40.20 BILATERAL INGUINAL HERNIA, WITHOUT OBSTRUCTION OR GANGRENE, NOT SPECIFIED AS RECURRENT: Chronic | ICD-10-CM

## 2019-08-23 LAB
ANION GAP SERPL CALC-SCNC: 15 MMO/L — HIGH (ref 7–14)
BASOPHILS # BLD AUTO: 0.04 K/UL — SIGNIFICANT CHANGE UP (ref 0–0.2)
BASOPHILS NFR BLD AUTO: 0.9 % — SIGNIFICANT CHANGE UP (ref 0–2)
BLD GP AB SCN SERPL QL: NEGATIVE — SIGNIFICANT CHANGE UP
BUN SERPL-MCNC: 12 MG/DL — SIGNIFICANT CHANGE UP (ref 7–23)
CALCIUM SERPL-MCNC: 9.9 MG/DL — SIGNIFICANT CHANGE UP (ref 8.4–10.5)
CHLORIDE SERPL-SCNC: 102 MMOL/L — SIGNIFICANT CHANGE UP (ref 98–107)
CO2 SERPL-SCNC: 27 MMOL/L — SIGNIFICANT CHANGE UP (ref 22–31)
CREAT SERPL-MCNC: 1.03 MG/DL — SIGNIFICANT CHANGE UP (ref 0.5–1.3)
EOSINOPHIL # BLD AUTO: 0.18 K/UL — SIGNIFICANT CHANGE UP (ref 0–0.5)
EOSINOPHIL NFR BLD AUTO: 4.2 % — SIGNIFICANT CHANGE UP (ref 0–6)
GLUCOSE SERPL-MCNC: 84 MG/DL — SIGNIFICANT CHANGE UP (ref 70–99)
HCT VFR BLD CALC: 44.3 % — SIGNIFICANT CHANGE UP (ref 39–50)
HGB BLD-MCNC: 14.6 G/DL — SIGNIFICANT CHANGE UP (ref 13–17)
IMM GRANULOCYTES NFR BLD AUTO: 0.2 % — SIGNIFICANT CHANGE UP (ref 0–1.5)
LYMPHOCYTES # BLD AUTO: 2.09 K/UL — SIGNIFICANT CHANGE UP (ref 1–3.3)
LYMPHOCYTES # BLD AUTO: 48.9 % — HIGH (ref 13–44)
MCHC RBC-ENTMCNC: 28.7 PG — SIGNIFICANT CHANGE UP (ref 27–34)
MCHC RBC-ENTMCNC: 33 % — SIGNIFICANT CHANGE UP (ref 32–36)
MCV RBC AUTO: 87.2 FL — SIGNIFICANT CHANGE UP (ref 80–100)
MONOCYTES # BLD AUTO: 0.31 K/UL — SIGNIFICANT CHANGE UP (ref 0–0.9)
MONOCYTES NFR BLD AUTO: 7.3 % — SIGNIFICANT CHANGE UP (ref 2–14)
NEUTROPHILS # BLD AUTO: 1.64 K/UL — LOW (ref 1.8–7.4)
NEUTROPHILS NFR BLD AUTO: 38.5 % — LOW (ref 43–77)
NRBC # FLD: 0 K/UL — SIGNIFICANT CHANGE UP (ref 0–0)
PLATELET # BLD AUTO: 194 K/UL — SIGNIFICANT CHANGE UP (ref 150–400)
PMV BLD: 10.8 FL — SIGNIFICANT CHANGE UP (ref 7–13)
POTASSIUM SERPL-MCNC: 4 MMOL/L — SIGNIFICANT CHANGE UP (ref 3.5–5.3)
POTASSIUM SERPL-SCNC: 4 MMOL/L — SIGNIFICANT CHANGE UP (ref 3.5–5.3)
RBC # BLD: 5.08 M/UL — SIGNIFICANT CHANGE UP (ref 4.2–5.8)
RBC # FLD: 14.3 % — SIGNIFICANT CHANGE UP (ref 10.3–14.5)
RH IG SCN BLD-IMP: POSITIVE — SIGNIFICANT CHANGE UP
SODIUM SERPL-SCNC: 144 MMOL/L — SIGNIFICANT CHANGE UP (ref 135–145)
WBC # BLD: 4.27 K/UL — SIGNIFICANT CHANGE UP (ref 3.8–10.5)
WBC # FLD AUTO: 4.27 K/UL — SIGNIFICANT CHANGE UP (ref 3.8–10.5)

## 2019-08-23 NOTE — H&P PST ADULT - NEGATIVE GENERAL GENITOURINARY SYMPTOMS
no flank pain L/no hematuria/no flank pain R no dysuria/no urinary hesitancy/no flank pain R/no urine discoloration/no bladder infections/normal urinary frequency/no gas in urine/no renal colic/no hematuria/no nocturia no urine discoloration/no dysuria/no gas in urine/no urinary hesitancy/no nocturia/no bladder infections/no hematuria/no flank pain L/no renal colic/no flank pain R/normal urinary frequency

## 2019-08-23 NOTE — H&P PST ADULT - HISTORY OF PRESENT ILLNESS
43 yr old Black male with medical hx htn and congenital hydrocephalus, pmhx stroke @ 11 yrs old during  shunt revision presents for preop evaluation with c/o right neck lump x ~ 9 months (of note patient is a poor historian.) Pt was evaluated by  Neuro s/p MRI.  As per patient "the shunt is not working it has a slow leak".  Pt is now scheduled for Stereotactic Endoscopic Third Ventriculostomy, Possible Ventriculoperitoneal Shunt Revision on 03/18/19. 43 yr old Black male with PMH: HTN, congenital hydrocephalus, Stroke @ 11 yrs old during  shunt revision presents for preop evaluation - S/P Stereotactic Endoscopic Third Ventriculostomy, Possible Ventriculoperitoneal Shunt Revision on 03/18/19. Pt is now scheduled for removal of retained shunt hardware on 09/10/19

## 2019-08-23 NOTE — H&P PST ADULT - NEGATIVE GASTROINTESTINAL SYMPTOMS
no vomiting/no diarrhea/no nausea/no steatorrhea/no hiccoughs/no constipation/no abdominal pain/no hematochezia/no melena/no jaundice

## 2019-08-23 NOTE — H&P PST ADULT - NSICDXPROBLEM_GEN_ALL_CORE_FT
PROBLEM DIAGNOSES  Problem: Hydrocephalus, unspecified  Assessment and Plan: Scheduled for removal of retained shunt hardware on 09/10/19. Pre op instructions, famotidine given and explained. Pt verbalized understanding.    Problem: HTN (hypertension)  Assessment and Plan: /100- 140/98 during PST, pt denies any headaches, change in LOC, change in vision, lightheaded and any other discomfort.  Pt has scheduled appt to see PCP on 09/05 for consultation pre op  Telephone call to PCP, N/A at present.  Pt instructed to call PCP for sooner appointment.  Pending medical consultation

## 2019-08-23 NOTE — H&P PST ADULT - NEGATIVE NEUROLOGICAL SYMPTOMS
no headache/no generalized seizures/no focal seizures no tremors/no vertigo/no headache/no loss of sensation/no difficulty walking/no weakness/no focal seizures/no generalized seizures/no facial palsy

## 2019-08-23 NOTE — H&P PST ADULT - RS GEN PE MLT RESP DETAILS PC
breath sounds equal/airway patent/good air movement/respirations non-labored/no chest wall tenderness/no intercostal retractions/no rales airway patent/breath sounds equal/no wheezes/respirations non-labored/no rales/no intercostal retractions/good air movement/no chest wall tenderness/no rhonchi

## 2019-08-23 NOTE — H&P PST ADULT - NEGATIVE OPHTHALMOLOGIC SYMPTOMS
no lacrimation L/no photophobia/no lacrimation R/no blurred vision L/no scleral injection L/no loss of vision R/no pain R/no irritation L/no loss of vision L/no diplopia/no discharge L/no pain L/no irritation R/no blurred vision R/no discharge R

## 2019-08-23 NOTE — H&P PST ADULT - NEGATIVE CARDIOVASCULAR SYMPTOMS
no claudication/no chest pain/no dyspnea on exertion/no palpitations/no peripheral edema no dyspnea on exertion/no claudication/no palpitations/no orthopnea/no peripheral edema/no paroxysmal nocturnal dyspnea/no chest pain

## 2019-08-23 NOTE — H&P PST ADULT - NEGATIVE ENMT SYMPTOMS
no nose bleeds/no abnormal taste sensation/no vertigo/no recurrent cold sores/no nasal congestion/no nasal obstruction/no ear pain/no tinnitus/no hearing difficulty/no sinus symptoms/no throat pain/no dysphagia/no nasal discharge/no post-nasal discharge/no gum bleeding/no dry mouth

## 2019-08-23 NOTE — H&P PST ADULT - NSICDXPASTSURGICALHX_GEN_ALL_CORE_FT
PAST SURGICAL HISTORY:  Bilateral inguinal hernia     History of creation of ventriculoperitoneal shunt at couple months old in [place] - left shunt currently not working    S/P  shunt at 11 yrs old at that time patient had stroke PAST SURGICAL HISTORY:  Bilateral inguinal hernia     History of creation of ventriculoperitoneal shunt at couple months old in [place] - left shunt currently not working    History of surgery on arm left    S/P ventricular shunt placement 03/2019    S/P  shunt at 11 yrs old at that time patient had stroke

## 2019-08-23 NOTE — H&P PST ADULT - NEGATIVE GENERAL SYMPTOMS
no fatigue no fever/no chills/no anorexia/no weight loss/no weight gain/no malaise/no fatigue/no polydipsia

## 2019-08-23 NOTE — H&P PST ADULT - MUSCULOSKELETAL
details… detailed exam no joint warmth/left sided/no joint swelling/no joint erythema/decreased ROM due to pain no joint swelling/decreased ROM due to pain/diminished strength/left sided/no joint erythema/no joint warmth/no calf tenderness

## 2019-09-09 ENCOUNTER — TRANSCRIPTION ENCOUNTER (OUTPATIENT)
Age: 44
End: 2019-09-09

## 2019-09-10 ENCOUNTER — INPATIENT (INPATIENT)
Facility: HOSPITAL | Age: 44
LOS: 0 days | Discharge: HOME | End: 2019-09-11
Attending: NEUROLOGICAL SURGERY | Admitting: NEUROLOGICAL SURGERY

## 2019-09-10 VITALS
TEMPERATURE: 98 F | DIASTOLIC BLOOD PRESSURE: 91 MMHG | SYSTOLIC BLOOD PRESSURE: 149 MMHG | WEIGHT: 179.9 LBS | HEIGHT: 68 IN | HEART RATE: 88 BPM | RESPIRATION RATE: 16 BRPM | OXYGEN SATURATION: 96 %

## 2019-09-10 DIAGNOSIS — G91.9 HYDROCEPHALUS, UNSPECIFIED: ICD-10-CM

## 2019-09-10 DIAGNOSIS — Z98.2 PRESENCE OF CEREBROSPINAL FLUID DRAINAGE DEVICE: ICD-10-CM

## 2019-09-10 DIAGNOSIS — Z98.2 PRESENCE OF CEREBROSPINAL FLUID DRAINAGE DEVICE: Chronic | ICD-10-CM

## 2019-09-10 DIAGNOSIS — Z92.89 PERSONAL HISTORY OF OTHER MEDICAL TREATMENT: Chronic | ICD-10-CM

## 2019-09-10 DIAGNOSIS — Z98.890 OTHER SPECIFIED POSTPROCEDURAL STATES: Chronic | ICD-10-CM

## 2019-09-10 DIAGNOSIS — K40.20 BILATERAL INGUINAL HERNIA, WITHOUT OBSTRUCTION OR GANGRENE, NOT SPECIFIED AS RECURRENT: Chronic | ICD-10-CM

## 2019-09-10 RX ORDER — SODIUM CHLORIDE 9 MG/ML
1000 INJECTION INTRAMUSCULAR; INTRAVENOUS; SUBCUTANEOUS
Refills: 0 | Status: DISCONTINUED | OUTPATIENT
Start: 2019-09-10 | End: 2019-09-10

## 2019-09-10 RX ORDER — OXYCODONE AND ACETAMINOPHEN 5; 325 MG/1; MG/1
1 TABLET ORAL EVERY 4 HOURS
Refills: 0 | Status: DISCONTINUED | OUTPATIENT
Start: 2019-09-10 | End: 2019-09-10

## 2019-09-10 RX ORDER — OXYCODONE AND ACETAMINOPHEN 5; 325 MG/1; MG/1
2 TABLET ORAL EVERY 4 HOURS
Refills: 0 | Status: DISCONTINUED | OUTPATIENT
Start: 2019-09-10 | End: 2019-09-10

## 2019-09-10 RX ORDER — FENTANYL CITRATE 50 UG/ML
25 INJECTION INTRAVENOUS
Refills: 0 | Status: DISCONTINUED | OUTPATIENT
Start: 2019-09-10 | End: 2019-09-10

## 2019-09-10 RX ORDER — OXYCODONE HYDROCHLORIDE 5 MG/1
5 TABLET ORAL EVERY 4 HOURS
Refills: 0 | Status: DISCONTINUED | OUTPATIENT
Start: 2019-09-10 | End: 2019-09-11

## 2019-09-10 RX ORDER — FENTANYL CITRATE 50 UG/ML
50 INJECTION INTRAVENOUS
Refills: 0 | Status: DISCONTINUED | OUTPATIENT
Start: 2019-09-10 | End: 2019-09-10

## 2019-09-10 RX ORDER — DOCUSATE SODIUM 100 MG
100 CAPSULE ORAL THREE TIMES A DAY
Refills: 0 | Status: DISCONTINUED | OUTPATIENT
Start: 2019-09-10 | End: 2019-09-11

## 2019-09-10 RX ORDER — ACETAMINOPHEN 500 MG
650 TABLET ORAL EVERY 6 HOURS
Refills: 0 | Status: DISCONTINUED | OUTPATIENT
Start: 2019-09-10 | End: 2019-09-11

## 2019-09-10 RX ORDER — DEXTROSE MONOHYDRATE, SODIUM CHLORIDE, AND POTASSIUM CHLORIDE 50; .745; 4.5 G/1000ML; G/1000ML; G/1000ML
1000 INJECTION, SOLUTION INTRAVENOUS
Refills: 0 | Status: DISCONTINUED | OUTPATIENT
Start: 2019-09-10 | End: 2019-09-11

## 2019-09-10 RX ORDER — CEPHALEXIN 500 MG
1 CAPSULE ORAL
Qty: 6 | Refills: 0
Start: 2019-09-10 | End: 2019-09-12

## 2019-09-10 RX ORDER — ACETAMINOPHEN 500 MG
1000 TABLET ORAL ONCE
Refills: 0 | Status: COMPLETED | OUTPATIENT
Start: 2019-09-10 | End: 2019-09-10

## 2019-09-10 RX ORDER — OXYCODONE HYDROCHLORIDE 5 MG/1
5 TABLET ORAL ONCE
Refills: 0 | Status: DISCONTINUED | OUTPATIENT
Start: 2019-09-10 | End: 2019-09-10

## 2019-09-10 RX ORDER — ONDANSETRON 8 MG/1
4 TABLET, FILM COATED ORAL ONCE
Refills: 0 | Status: DISCONTINUED | OUTPATIENT
Start: 2019-09-10 | End: 2019-09-10

## 2019-09-10 RX ORDER — MORPHINE SULFATE 50 MG/1
2 CAPSULE, EXTENDED RELEASE ORAL
Refills: 0 | Status: DISCONTINUED | OUTPATIENT
Start: 2019-09-10 | End: 2019-09-11

## 2019-09-10 RX ORDER — OXYCODONE HYDROCHLORIDE 5 MG/1
10 TABLET ORAL EVERY 4 HOURS
Refills: 0 | Status: DISCONTINUED | OUTPATIENT
Start: 2019-09-10 | End: 2019-09-11

## 2019-09-10 RX ADMIN — Medication 2.5 MILLIGRAM(S): at 20:18

## 2019-09-10 RX ADMIN — Medication 400 MILLIGRAM(S): at 18:46

## 2019-09-10 RX ADMIN — DEXTROSE MONOHYDRATE, SODIUM CHLORIDE, AND POTASSIUM CHLORIDE 75 MILLILITER(S): 50; .745; 4.5 INJECTION, SOLUTION INTRAVENOUS at 21:32

## 2019-09-10 RX ADMIN — DEXTROSE MONOHYDRATE, SODIUM CHLORIDE, AND POTASSIUM CHLORIDE 75 MILLILITER(S): 50; .745; 4.5 INJECTION, SOLUTION INTRAVENOUS at 19:17

## 2019-09-10 RX ADMIN — Medication 1000 MILLIGRAM(S): at 19:17

## 2019-09-10 RX ADMIN — OXYCODONE HYDROCHLORIDE 5 MILLIGRAM(S): 5 TABLET ORAL at 18:45

## 2019-09-10 NOTE — BRIEF OPERATIVE NOTE - NSICDXBRIEFPROCEDURE_GEN_ALL_CORE_FT
PROCEDURES:  Revision or replacement, shunt, ventriculoperitoneal 10-Sep-2019 18:20:17  Don Marmolejo

## 2019-09-10 NOTE — ASU PREOP CHECKLIST - 2.
pt has numbness and left side weakness from stroke at 11 years old. Pt able to walk independently. Requested IV to be on R. arm.

## 2019-09-10 NOTE — ASU DISCHARGE PLAN (ADULT/PEDIATRIC) - CARE PROVIDER_API CALL
Kristian Saldana (MD)  Neurological Surgery; Pediatric Neurological Surgery  [address] [facility], [address]  [place], [place] [address]  Phone: [phone]  Fax: [phone]  Follow Up Time:

## 2019-09-10 NOTE — PROGRESS NOTE ADULT - SUBJECTIVE AND OBJECTIVE BOX
Neurosurgery postop  C/O abdominal incisional pain  Vital Signs Last 24 Hrs  T(C): 36.8 (10 Sep 2019 18:30), Max: 36.9 (10 Sep 2019 14:03)  T(F): 98.2 (10 Sep 2019 18:30), Max: 98.5 (10 Sep 2019 14:03)  HR: 91 (10 Sep 2019 20:30) (88 - 98)  BP: 144/102 (10 Sep 2019 20:30) (130/93 - 154/111)  BP(mean): 111 (10 Sep 2019 20:30) (99 - 120)  RR: 19 (10 Sep 2019 20:30) (11 - 21)  SpO2: 95% (10 Sep 2019 20:30) (92% - 98%)    AAO X 3  PERRLA with left eye dysconjugate gaze  CN 2-12 otherwise grossly intact  SCHRADER strength 5/5 with left hand contracture  SILT    Dressings C/D/I    MEDICATIONS  (STANDING):  docusate sodium 100 milliGRAM(s) Oral three times a day  sodium chloride 0.9% with potassium chloride 20 mEq/L 1000 milliLiter(s) (75 mL/Hr) IV Continuous <Continuous>    MEDICATIONS  (PRN):  acetaminophen   Tablet .. 650 milliGRAM(s) Oral every 6 hours PRN Temp greater or equal to 38C (100.4F), Mild Pain (1 - 3)  fentaNYL    Injectable 25 MICROGram(s) IV Push every 5 minutes PRN Moderate Pain (4 - 6)  fentaNYL    Injectable 50 MICROGram(s) IV Push every 5 minutes PRN Severe Pain (7 - 10)  morphine  - Injectable 2 milliGRAM(s) IV Push every 3 hours PRN Severe Pain (7 - 10)  ondansetron Injectable 4 milliGRAM(s) IV Push once PRN Nausea and/or Vomiting  oxyCODONE    IR 5 milliGRAM(s) Oral every 4 hours PRN Moderate Pain (4 - 6)  oxyCODONE    IR 10 milliGRAM(s) Oral every 4 hours PRN Severe Pain (7 - 10)

## 2019-09-10 NOTE — ASU DISCHARGE PLAN (ADULT/PEDIATRIC) - ASU DC SPECIAL INSTRUCTIONSFT
Please see Dr. Saldana in the office in 10-14 days for removal of the staples. Please do not shower or get incision wet for 4 days.

## 2019-09-10 NOTE — ASU DISCHARGE PLAN (ADULT/PEDIATRIC) - CALL YOUR DOCTOR IF YOU HAVE ANY OF THE FOLLOWING:
Swelling that gets worse/Fever greater than (need to indicate Fahrenheit or Celsius)/Bleeding that does not stop/Wound/Surgical Site with redness, or foul smelling discharge or pus

## 2019-09-11 ENCOUNTER — TRANSCRIPTION ENCOUNTER (OUTPATIENT)
Age: 44
End: 2019-09-11

## 2019-09-11 VITALS
DIASTOLIC BLOOD PRESSURE: 84 MMHG | SYSTOLIC BLOOD PRESSURE: 139 MMHG | RESPIRATION RATE: 17 BRPM | TEMPERATURE: 98 F | HEART RATE: 86 BPM | OXYGEN SATURATION: 95 %

## 2019-09-11 RX ORDER — SODIUM CHLORIDE 9 MG/ML
3 INJECTION INTRAMUSCULAR; INTRAVENOUS; SUBCUTANEOUS EVERY 8 HOURS
Refills: 0 | Status: DISCONTINUED | OUTPATIENT
Start: 2019-09-11 | End: 2019-09-11

## 2019-09-11 RX ADMIN — SODIUM CHLORIDE 3 MILLILITER(S): 9 INJECTION INTRAMUSCULAR; INTRAVENOUS; SUBCUTANEOUS at 05:09

## 2019-09-11 RX ADMIN — Medication 100 MILLIGRAM(S): at 09:24

## 2019-09-11 RX ADMIN — OXYCODONE HYDROCHLORIDE 10 MILLIGRAM(S): 5 TABLET ORAL at 01:08

## 2019-09-11 RX ADMIN — OXYCODONE HYDROCHLORIDE 10 MILLIGRAM(S): 5 TABLET ORAL at 00:38

## 2019-09-11 RX ADMIN — OXYCODONE HYDROCHLORIDE 10 MILLIGRAM(S): 5 TABLET ORAL at 09:23

## 2019-09-11 RX ADMIN — OXYCODONE HYDROCHLORIDE 10 MILLIGRAM(S): 5 TABLET ORAL at 10:20

## 2019-09-11 NOTE — DISCHARGE NOTE PROVIDER - NSDCCPCAREPLAN_GEN_ALL_CORE_FT
PRINCIPAL DISCHARGE DIAGNOSIS  Diagnosis: S/P ventriculoperitoneal shunt  Assessment and Plan of Treatment: S/P ventriculoperitoneal shunt

## 2019-09-11 NOTE — DISCHARGE NOTE PROVIDER - CARE PROVIDER_API CALL
Kristian Saldana)  Neurological Surgery; Pediatric Neurological Surgery  [address] [facility], [address]  [place], [place] [id number]  Phone: [phone]  Fax: [phone]  Follow Up Time: 1 week

## 2019-09-11 NOTE — PROGRESS NOTE ADULT - SUBJECTIVE AND OBJECTIVE BOX
No issues overnight  pain better controlled  Vital Signs Last 24 Hrs  T(C): 36.9 (11 Sep 2019 00:37), Max: 37 (10 Sep 2019 20:40)  T(F): 98.4 (11 Sep 2019 00:37), Max: 98.6 (10 Sep 2019 20:40)  HR: 99 (11 Sep 2019 00:37) (88 - 99)  BP: 131/84 (11 Sep 2019 00:37) (110/96 - 154/111)  BP(mean): 94 (10 Sep 2019 21:00) (94 - 120)  RR: 17 (11 Sep 2019 00:37) (11 - 21)  SpO2: 95% (11 Sep 2019 00:37) (92% - 98%)    AAO X 3  PERRLA with left eye dysconjugate gaze  CN 2-12 otherwise grossly intact  SCHRADER strength 5/5 with left hand contracture  SILT    MEDICATIONS  (STANDING):  docusate sodium 100 milliGRAM(s) Oral three times a day  sodium chloride 0.9% lock flush 3 milliLiter(s) IV Push every 8 hours    MEDICATIONS  (PRN):  acetaminophen   Tablet .. 650 milliGRAM(s) Oral every 6 hours PRN Temp greater or equal to 38C (100.4F), Mild Pain (1 - 3)  morphine  - Injectable 2 milliGRAM(s) IV Push every 3 hours PRN Severe Pain (7 - 10)  oxyCODONE    IR 5 milliGRAM(s) Oral every 4 hours PRN Moderate Pain (4 - 6)  oxyCODONE    IR 10 milliGRAM(s) Oral every 4 hours PRN Severe Pain (7 - 10)

## 2019-09-11 NOTE — DISCHARGE NOTE PROVIDER - NSDCCPTREATMENT_GEN_ALL_CORE_FT
PRINCIPAL PROCEDURE  Procedure: Revision or replacement, shunt, ventriculoperitoneal  Findings and Treatment: Removal of shunt

## 2019-09-11 NOTE — DISCHARGE NOTE PROVIDER - NSDCFUADDINST_GEN_ALL_CORE_FT
1. Remove top surgical dressing on post operative day 3 unless it was removed by the surgical team prior to your discharge. Incision should be left uncovered after day 3.   2. Begin showering with shampoo on post operative day 4. Avoid long soaks and do not submerge incision in bathtub. Regular shower only and allow soap and water to run over the incision. Pat incision area dry with clean towel- do not scrub. Please shower regularly to ensure incision stays clean to avoid post operative infections.   3. Notify your surgeon if you notice increased redness, drainage or you notice incision area opening.   4. Return to ER immediately for high fevers, severe headache, vomiting, lethargy or  weakness  5. Please call your neurosurgeon following discharge to make follow up appointment in 1 week after discharge unless otherwise specified. See Contact information below.   6. Prescription Post operative medication, if applicable, are sent to [facility] (unless another pharmacy specified)- [facility] is located in [facility] [employer]. All post operative prescrptions should be picked up before departing the hospital.  7. Ambulate as tolerate. Continue with all "activities of daily living." Avoid strenuous activity or lifting more than 10 pounds until cleared for additional activity at your follow up appointment.  8. Do not return to work or school until cleared by your neurosurgeon at your follow up visit unless specified to you during your hospital stay

## 2019-09-11 NOTE — DISCHARGE NOTE PROVIDER - HOSPITAL COURSE
HPI:    43 yr old Black male with PMH: HTN, congenital hydrocephalus, Stroke @ 11 yrs old during  shunt revision presents for preop evaluation - S/P Stereotactic Endoscopic Third Ventriculostomy, Possible Ventriculoperitoneal Shunt Revision on 03/18/19. Pt is now scheduled for removal of retained shunt hardware on 09/10/19 (23 Aug 2019 17:51)        Patient underwent removal of retained VPS. Did well. Was observed overnight and did ok,. stable for d/c home today

## 2020-03-01 NOTE — ASU PREOP CHECKLIST - PATIENT SENT TO
Scribe Attestation (For Scribes USE Only)... operating room Attending Attestation (For Attendings USE Only).../Scribe Attestation (For Scribes USE Only)...

## 2020-12-22 NOTE — PATIENT PROFILE ADULT - NSALCOHOLLASTUSEDT_GEN_A_NUR
Dressings:  Dressings may be discontinued on the LEFT foot. If a new wound opens or starts to drain, start applying a small amount of antibiotic cream, gauze and a bandage.  -RIGHT foot dressing to be changed every other day with oil emulsion gauze (add a small amount of wound gel if the wound dries out), gauze and tape or Kerlix and tape.   -Do not apply compression to the legs until patient's vascular status is confirmed.      -Eucerin Cream may be sued for the dry skin of the bilateral foot where there are no open wound  -Hold off on walking for physical therapy until patient's vascular status is determined by vascular. Thank you    Upcoming appointments:  -Vascular (Dr. Laquita Yates's office) will be calling you to schedule a CT angiogram test. If this is scheduled in [place] the same week as your podiatry appointment, call podiatry as they may be able to coordinate your podiatry appointment with this test.   -Podiatry: Follow-up on 01/07/2021 at 2:45 p.m.        Thank you for allowing  and our Podiatry team to participate in your care. Please call our office at [phone] with scheduling questions or with any other questions or concerns.      
01-Jun-2019

## 2021-01-14 NOTE — ED PROVIDER NOTE - OBJECTIVE STATEMENT
Patient was expecting a call from someone regarding BRCA testing and has not heard from anyone    Please call    Phone:[phone]    Message ok   43M PMH congenital hydrocephalus s/p  shunt with multiple revisions and recent repair of CSF leak p/w sudden onsent RLQ pain this AM which then moved to the LLQ. No associated fevers, chills, nausea, vomiting, constipation, melena, hematochezia, dysuria, hematuria. No recent injuries or heavy lifting. Denies new foods.

## 2021-03-06 NOTE — PATIENT PROFILE ADULT - NSPROIMPLANTSMEDDEV_GEN_A_NUR
Alert-The patient is alert, awake and responds to voice. The patient is oriented to time, place, and person. The triage nurse is able to obtain subjective information. Ventriculoperitoneal shunt

## 2021-04-08 NOTE — H&P ADULT - PROBLEM SELECTOR PLAN 2
Addended by: Alejandra Henderson on: 4/8/2021 03:06 PM     Modules accepted: Orders - Monitor BP  - Continue home meds

## 2021-04-15 NOTE — ED PROVIDER NOTE - CPE EDP CARDIAC NORM
Quality 431: Preventive Care And Screening: Unhealthy Alcohol Use - Screening: Patient screened for unhealthy alcohol use using a single question and scores less than 2 times per year Detail Level: Detailed Quality 130: Documentation Of Current Medications In The Medical Record: Current Medications Documented Quality 226: Preventive Care And Screening: Tobacco Use: Screening And Cessation Intervention: Patient screened for tobacco use and is an ex/non-smoker normal...

## 2021-08-02 NOTE — ED PROVIDER NOTE - NSCAREINITIATED _GEN_ER
Procedure(s):  EP STUDY COMPLETE.    total IV anesthesia, general, general - backup    Anesthesia Post Evaluation      Multimodal analgesia: multimodal analgesia not used between 6 hours prior to anesthesia start to PACU discharge  Patient location during evaluation: PACU  Patient participation: complete - patient participated  Level of consciousness: awake and alert  Pain score: 0  Pain management: adequate  Airway patency: patent  Anesthetic complications: no  Cardiovascular status: blood pressure returned to baseline, hemodynamically stable and acceptable  Respiratory status: acceptable, nonlabored ventilation, unassisted, spontaneous ventilation and room air  Hydration status: acceptable  Post anesthesia nausea and vomiting:  none  Final Post Anesthesia Temperature Assessment:  Normothermia (36.0-37.5 degrees C)      INITIAL Post-op Vital signs:   Vitals Value Taken Time   /73 08/02/21 1020   Temp 36.1 °C (97 °F) 08/02/21 1016   Pulse 86 08/02/21 1020   Resp 20 08/02/21 1020   SpO2 98 % 08/02/21 1020
Erlinda Almaguer(Attending)

## 2021-10-04 NOTE — ASU PREOP CHECKLIST - PATIENT SENT TO
"Chief Complaint   Patient presents with     Well Child     Contraception     change birth control       HEARING FREQUENCY    Right Ear:      1000 Hz RESPONSE- on Level:   20 db  (Conditioning sound)   1000 Hz: RESPONSE- on Level:   20 db    2000 Hz: RESPONSE- on Level:   20 db    4000 Hz: RESPONSE- on Level:   20 db     Left Ear:      4000 Hz: RESPONSE- on Level:   20 db    2000 Hz: RESPONSE- on Level:   20 db    1000 Hz: RESPONSE- on Level:   20 db     500 Hz: RESPONSE- on Level:   20 db     Right Ear:    500 Hz: RESPONSE- on Level:   20 db     Hearing Acuity: Pass    Hearing Assessment: normal    Visual Acuity Screening - [place] Chart   Visualacuity OD (right eye): 10/ 12.5   Visual acuity OS (left eye): 10/ 16   Visual acuity OU (both eyes): 10/ 10   Corrective lenses worn: Yes- contacts       Initial /60   Pulse 90   Temp 97.4  F (36.3  C) (Temporal)   Resp 16   Ht 1.676 m (5' 6\")   Wt 62.6 kg (138 lb)   LMP 09/13/2021   SpO2 99%   BMI 22.27 kg/m   Estimated body mass index is 22.27 kg/m  as calculated from the following:    Height as of this encounter: 1.676 m (5' 6\").    Weight as of this encounter: 62.6 kg (138 lb).     FOOD SECURITY SCREENING QUESTIONS  Hunger Vital Signs:  Within the past 12 months we worried whether our food would run out before we got money to buy more. Never  Within the past 12 months the food we bought just didn't last and we didn't have money to get more. Never      Medication Reconciliation: Complete      Norma J. Gosselin, LPN   "
operating room

## 2022-10-13 NOTE — ED PROVIDER NOTE - TRANSFER CONSULTATION #1
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 She developed PVCs during the first cycle of induction chemotherapy, with vincristine, doxorubicin, cyclophosphamide + ifosfamide/etoposide. Prescribed metoprolol and enalapril.\par \par ECG done 10/5/2022 at [facility] showed prolongation of the QTc to 465.\par \par Seclidemstat (LSD1 inhibitor, [id number]) under investigation for relapsed Pang sarcoma.\par \par \par Cardiovascular Summary:\par --------------------------------------------------------------\par ECG:\par 10/14/2022:\par --------------------------------------------------------------\par Echo:\par 10/3/2022: LVEF 57 %, no significant change from prior\par --------------------------------------------------------------\par  will see patient in ED

## 2023-11-17 NOTE — H&P PST ADULT - VISION (WITH CORRECTIVE LENSES IF THE PATIENT USUALLY WEARS THEM):
Normal vision: sees adequately in most situations; can see medication labels, newsprint Universal Safety Interventions

## 2024-02-28 NOTE — ASU PATIENT PROFILE, ADULT - PATIENT REPRESENTATIVE PHONE
Primary Care Provider Appointment   [facility]       Patient ID: Mckenzie Mari is a 74 y.o. female.    ASSESSMENT/PLAN by Problem List:    1. Chronic systolic congestive heart failure  Assessment & Plan:  This condition has been reviewed and evaluated and it is currently stable. Exam stable without sign of decompensation.  Continue current meds and cardiology consult.        2. ICD (implantable cardioverter-defibrillator) in place  Overview:  Biotronik ICD, bi-ventricular    Assessment & Plan:  This is a chronic condition.  This condition has been reviewed and evaluated and it is currently stable. Continue current meds and cardiology consult      3. Type 2 diabetes mellitus with diabetic neuropathy, with long-term current use of insulin  Assessment & Plan:  Last A1c 8.1%.  mild improvement.  She does follow with Endocrinology.  Discussed nutrition and exercise. She will continue to work with endocrine.      4. Severe obesity  Assessment & Plan:  Working on improving diabetes and lowering insulin   Needs more exercise      5. Primary hypertension  Assessment & Plan:  This is a chronic condition.  This condition has been reviewed and evaluated and it is currently stable.  Continue current BP meds      6. Mixed hyperlipidemia  -     Lipid Panel; Future; Expected date: 02/28/2024    7. Calcified granuloma of lung  Assessment & Plan:  Noted upon chart review of previous imagin.  Multiple calcified granulomas noted of the lung.  These likely represent old granulomatous disease or scarring.  Will monitor clinically for any change in condition or symptoms.  Currently stable      8. Aortic calcification  Assessment & Plan:  Stable, continue risk factor control, continue crestor      9. Colon cancer screening  Assessment & Plan:  No h/o polyps, last cscope 2016, repeat in 1-2 years             Follow Up:  3 months    Subjective:     Chief Complaint   Patient presents with     Follow-up    Immunizations     Pt states she feels so bad and sick for days whenever she gets immunizations.      I have reviewed the information entered by the ancillary staff regarding the chief complaint as well as the related history.    Follow-up  Associated symptoms include arthralgias and joint swelling. Pertinent negatives include no chest pain, headaches, neck pain, vomiting or weakness.       Patient is a/an 74 y.o.  female     Patient presents today for routine follow up of chronic conditions and labs.  See above under assess/plan for details on all conditions that were evaluated, monitored, and/or addressed today.    Sore today, hives after Shingrix, advised claritin and tylenol, benadryl if needed    For complete problem list, past medical history, surgical history, social history, etc., see appropriate section in the electronic medical record    Review of Systems   Constitutional:  Negative for activity change and unexpected weight change.   HENT:  Negative for hearing loss, rhinorrhea and trouble swallowing.    Eyes:  Negative for discharge and visual disturbance.   Respiratory:  Negative for chest tightness and wheezing.    Cardiovascular:  Negative for chest pain and palpitations.   Gastrointestinal:  Negative for blood in stool, constipation, diarrhea and vomiting.   Endocrine: Negative for polydipsia and polyuria.   Genitourinary:  Negative for difficulty urinating, dysuria, hematuria and menstrual problem.   Musculoskeletal:  Positive for arthralgias and joint swelling. Negative for neck pain.   Neurological:  Negative for weakness and headaches.   Psychiatric/Behavioral:  Negative for confusion and dysphoric mood.        Objective     Physical Exam  Cardiovascular:      Pulses:           Dorsalis pedis pulses are 2+ on the right side and 2+ on the left side.        Posterior tibial pulses are 2+ on the right side and 2+ on the left side.   Feet:      Right foot:      Protective Sensation: 8  "sites tested.  8 sites sensed.      Skin integrity: Callus and dry skin present. No ulcer or skin breakdown.      Toenail Condition: Right toenails are abnormally thick. Fungal disease present.     Left foot:      Protective Sensation: 8 sites tested.  8 sites sensed.      Skin integrity: Callus and dry skin present. No ulcer or skin breakdown.      Toenail Condition: Left toenails are abnormally thick. Fungal disease present.      Vitals:    02/28/24 1428   BP: 126/70   BP Location: Right arm   Patient Position: Sitting   BP Method: Large (Manual)   Pulse: 75   Resp: 18   Temp: 98.2 °F (36.8 °C)   TempSrc: Oral   SpO2: 96%   Weight: 96.5 kg (212 lb 11.9 oz)   Height: 5' 2" (1.575 m)           THIS DOCUMENT WAS MADE IN PART WITH VOICE RECOGNITION SOFTWARE.  OCCASIONALLY THIS SOFTWARE WILL MISINTERPRET WORDS OR PHRASES.    " [id number]

## 2024-03-18 NOTE — OCCUPATIONAL THERAPY INITIAL EVALUATION ADULT - STANDING BALANCE: STATIC
"Pt arrives c/o dizziness since yesterday morning. Moving patient's head side to side makes it worse. Pt has a history of vertigo. When pt is \"laying in her bed - it feels like I'm sinking into my bed.\" Pt reports headache yesterday but not today.       Triage Assessment     Row Name 06/15/23 9237       Triage Assessment (Adult)    Airway WDL WDL       Respiratory WDL    Respiratory WDL WDL       Skin Circulation/Temperature WDL    Skin Circulation/Temperature WDL WDL       Cardiac WDL    Cardiac WDL WDL       Peripheral/Neurovascular WDL    Peripheral Neurovascular WDL WDL       Cognitive/Neuro/Behavioral WDL    Cognitive/Neuro/Behavioral WDL WDL              "
Unknown
fair plus

## 2024-04-05 NOTE — PATIENT PROFILE ADULT - NSPROPTRIGHTREPPHONE_GEN_A_NUR
Medication:   Requested Prescriptions     Pending Prescriptions Disp Refills    fenofibrate (TRIGLIDE) 160 MG tablet 90 tablet 1     Sig: Take 1 tablet by mouth daily    montelukast (SINGULAIR) 10 MG tablet 90 tablet 1     Sig: TAKE 1 TABLET BY MOUTH AT BEDTIME    pravastatin (PRAVACHOL) 20 MG tablet 90 tablet 1     Sig: Take 1 tablet by mouth daily       Last Filled:  1/5/2024, 90, 1  1/5/2024, 90, 1  1/5/2024, 90, 1    Patient Phone Number: [phone] (home)     Last appt: 9/21/2023   Next appt: Visit date not found    Last Lipid:   Lab Results   Component Value Date/Time    CHOL 148 07/09/2021 07:27 AM    TRIG 167 07/09/2021 07:27 AM    HDL 28 10/11/2023 07:58 AM    HDL 31 03/19/2012 07:42 AM    LDLCALC 83 10/11/2023 07:58 AM                [phone]

## 2024-04-09 NOTE — ED PROVIDER NOTE - TOBACCO USE
[facility]  [id number] [facility]  [place] [address]  Dept: [phone]  Dept Fax: [phone]  Loc: [phone]    Alex Rivera is a 8 m.o. female who presents today for her medical conditions/complaints as noted below.  Alex Rivera is c/o of Thrush (Mom says her tongue is white and her breath is awful, diarrhea yesterday), Fever ( said this afternoon and her temp was 102.7), and Congestion (Has some congestion)        HPI:     HPI   Chief Complaint   Patient presents with    Thrush     Mom says her tongue is white and her breath is awful, diarrhea yesterday    Fever      said this afternoon and her temp was 102.7    Congestion     Has some congestion     Past Medical History:   Diagnosis Date    RSV (respiratory syncytial virus infection)       No past surgical history on file.        4/9/2024     3:59 PM 3/25/2024     1:28 PM 2/5/2024     1:34 PM 1/17/2024     3:34 PM 2023     3:54 AM 2023     3:28 AM   Vitals   Pulse 136 136 142 136     Temp 98.9 °F (37.2 °C) 98.4 °F (36.9 °C) 99.6 °F (37.6 °C) 98.2 °F (36.8 °C)  97.8 °F (36.6 °C)   Resp 28 28 30 30     SpO2   97 %      Weight 16 lb 14 oz 16 lb 10 oz 14 lb 13 oz 13 lb 15 oz 12 lb 1.9 oz    Height   2' 1\" 2' 1\"     Body Mass Index   16.66 kg/m2 15.68 kg/m2         No family history on file.    Social History     Tobacco Use    Smoking status: Not on file    Smokeless tobacco: Not on file   Substance Use Topics    Alcohol use: Not on file      Current Outpatient Medications on File Prior to Visit   Medication Sig Dispense Refill    Respiratory Therapy Supplies (NEBULIZER/TUBING/MOUTHPIECE) KIT 1 kit by Does not apply route every 6 hours as needed (wheezing) For infant (Patient not taking: Reported on 1/17/2024) 1 kit 0    Nebulizers (AIRIAL COMPACT MINI NEBULIZER) MISC To use with breathing treatments every 6 hours for RSV (Patient not taking: Reported on 1/17/2024) 1 each 0    albuterol 
Never smoker

## 2024-09-12 ENCOUNTER — EMERGENCY (EMERGENCY)
Facility: HOSPITAL | Age: 49
LOS: 1 days | Discharge: HOME | End: 2024-09-12
Attending: STUDENT IN AN ORGANIZED HEALTH CARE EDUCATION/TRAINING PROGRAM | Admitting: STUDENT IN AN ORGANIZED HEALTH CARE EDUCATION/TRAINING PROGRAM
Payer: MEDICARE

## 2024-09-12 VITALS
DIASTOLIC BLOOD PRESSURE: 108 MMHG | HEIGHT: 68 IN | RESPIRATION RATE: 16 BRPM | TEMPERATURE: 98 F | HEART RATE: 89 BPM | WEIGHT: 195.11 LBS | OXYGEN SATURATION: 99 % | SYSTOLIC BLOOD PRESSURE: 162 MMHG

## 2024-09-12 VITALS
RESPIRATION RATE: 16 BRPM | TEMPERATURE: 99 F | SYSTOLIC BLOOD PRESSURE: 135 MMHG | DIASTOLIC BLOOD PRESSURE: 93 MMHG | OXYGEN SATURATION: 98 % | HEART RATE: 77 BPM

## 2024-09-12 DIAGNOSIS — Z92.89 PERSONAL HISTORY OF OTHER MEDICAL TREATMENT: Chronic | ICD-10-CM

## 2024-09-12 DIAGNOSIS — Z98.890 OTHER SPECIFIED POSTPROCEDURAL STATES: Chronic | ICD-10-CM

## 2024-09-12 DIAGNOSIS — Z98.2 PRESENCE OF CEREBROSPINAL FLUID DRAINAGE DEVICE: Chronic | ICD-10-CM

## 2024-09-12 DIAGNOSIS — K40.20 BILATERAL INGUINAL HERNIA, WITHOUT OBSTRUCTION OR GANGRENE, NOT SPECIFIED AS RECURRENT: Chronic | ICD-10-CM

## 2024-09-12 LAB
ALBUMIN SERPL ELPH-MCNC: 4.2 G/DL — SIGNIFICANT CHANGE UP (ref 3.3–5)
ALP SERPL-CCNC: 80 U/L — SIGNIFICANT CHANGE UP (ref 40–120)
ALT FLD-CCNC: 35 U/L — SIGNIFICANT CHANGE UP (ref 4–41)
ANION GAP SERPL CALC-SCNC: 16 MMOL/L — HIGH (ref 7–14)
AST SERPL-CCNC: 19 U/L — SIGNIFICANT CHANGE UP (ref 4–40)
BASOPHILS # BLD AUTO: 0.06 K/UL — SIGNIFICANT CHANGE UP (ref 0–0.2)
BASOPHILS NFR BLD AUTO: 1.7 % — SIGNIFICANT CHANGE UP (ref 0–2)
BILIRUB SERPL-MCNC: 0.3 MG/DL — SIGNIFICANT CHANGE UP (ref 0.2–1.2)
BUN SERPL-MCNC: 12 MG/DL — SIGNIFICANT CHANGE UP (ref 7–23)
CALCIUM SERPL-MCNC: 8.9 MG/DL — SIGNIFICANT CHANGE UP (ref 8.4–10.5)
CHLORIDE SERPL-SCNC: 104 MMOL/L — SIGNIFICANT CHANGE UP (ref 98–107)
CO2 SERPL-SCNC: 23 MMOL/L — SIGNIFICANT CHANGE UP (ref 22–31)
CREAT SERPL-MCNC: 1 MG/DL — SIGNIFICANT CHANGE UP (ref 0.5–1.3)
EGFR: 94 ML/MIN/1.73M2 — SIGNIFICANT CHANGE UP
EOSINOPHIL # BLD AUTO: 0.18 K/UL — SIGNIFICANT CHANGE UP (ref 0–0.5)
EOSINOPHIL NFR BLD AUTO: 5.1 % — SIGNIFICANT CHANGE UP (ref 0–6)
GLUCOSE SERPL-MCNC: 97 MG/DL — SIGNIFICANT CHANGE UP (ref 70–99)
HCT VFR BLD CALC: 40.3 % — SIGNIFICANT CHANGE UP (ref 39–50)
HGB BLD-MCNC: 13.9 G/DL — SIGNIFICANT CHANGE UP (ref 13–17)
IANC: 1.44 K/UL — LOW (ref 1.8–7.4)
IMM GRANULOCYTES NFR BLD AUTO: 0.3 % — SIGNIFICANT CHANGE UP (ref 0–0.9)
LYMPHOCYTES # BLD AUTO: 1.51 K/UL — SIGNIFICANT CHANGE UP (ref 1–3.3)
LYMPHOCYTES # BLD AUTO: 42.4 % — SIGNIFICANT CHANGE UP (ref 13–44)
MAGNESIUM SERPL-MCNC: 2.2 MG/DL — SIGNIFICANT CHANGE UP (ref 1.6–2.6)
MCHC RBC-ENTMCNC: 29.6 PG — SIGNIFICANT CHANGE UP (ref 27–34)
MCHC RBC-ENTMCNC: 34.5 GM/DL — SIGNIFICANT CHANGE UP (ref 32–36)
MCV RBC AUTO: 85.7 FL — SIGNIFICANT CHANGE UP (ref 80–100)
MONOCYTES # BLD AUTO: 0.36 K/UL — SIGNIFICANT CHANGE UP (ref 0–0.9)
MONOCYTES NFR BLD AUTO: 10.1 % — SIGNIFICANT CHANGE UP (ref 2–14)
NEUTROPHILS # BLD AUTO: 1.44 K/UL — LOW (ref 1.8–7.4)
NEUTROPHILS NFR BLD AUTO: 40.4 % — LOW (ref 43–77)
NRBC # BLD: 0 /100 WBCS — SIGNIFICANT CHANGE UP (ref 0–0)
NRBC # FLD: 0 K/UL — SIGNIFICANT CHANGE UP (ref 0–0)
PHOSPHATE SERPL-MCNC: 3 MG/DL — SIGNIFICANT CHANGE UP (ref 2.5–4.5)
PLATELET # BLD AUTO: 219 K/UL — SIGNIFICANT CHANGE UP (ref 150–400)
POTASSIUM SERPL-MCNC: 3.6 MMOL/L — SIGNIFICANT CHANGE UP (ref 3.5–5.3)
POTASSIUM SERPL-SCNC: 3.6 MMOL/L — SIGNIFICANT CHANGE UP (ref 3.5–5.3)
PROT SERPL-MCNC: 7.4 G/DL — SIGNIFICANT CHANGE UP (ref 6–8.3)
RBC # BLD: 4.7 M/UL — SIGNIFICANT CHANGE UP (ref 4.2–5.8)
RBC # FLD: 13.1 % — SIGNIFICANT CHANGE UP (ref 10.3–14.5)
SODIUM SERPL-SCNC: 143 MMOL/L — SIGNIFICANT CHANGE UP (ref 135–145)
WBC # BLD: 3.56 K/UL — LOW (ref 3.8–10.5)
WBC # FLD AUTO: 3.56 K/UL — LOW (ref 3.8–10.5)

## 2024-09-12 PROCEDURE — 93010 ELECTROCARDIOGRAM REPORT: CPT

## 2024-09-12 PROCEDURE — 99285 EMERGENCY DEPT VISIT HI MDM: CPT | Mod: GC

## 2024-09-12 PROCEDURE — 70496 CT ANGIOGRAPHY HEAD: CPT | Mod: 26,MC

## 2024-09-12 PROCEDURE — 70498 CT ANGIOGRAPHY NECK: CPT | Mod: 26,MC

## 2024-09-12 NOTE — ED ADULT TRIAGE NOTE - CHIEF COMPLAINT QUOTE
pt c/o dizziness x 5 days. states he has also been feeling dazed over. reports hx of hydrocephalus, s/p Endoscopic third ventriculostomy in 2019.   left side weaker since 16 years of age.

## 2024-09-12 NOTE — ED ADULT TRIAGE NOTE - NSWEIGHTCALCTOOLDRUG_GEN_A_CORE
Received in bed, patient awake, alert and oriented. Denied CP, no C/O SOB. Will be NPO after midnight for cardiac cath tomorrow. BS monitoring. Scheduled medications given as ordered. Will continue to monitor.    used

## 2024-09-12 NOTE — ED PROVIDER NOTE - PROGRESS NOTE DETAILS
Patient standing up at bedside, states having some dizziness but continues to have no pain. Awaiting CT scan Patient continues to feel well. CT HEAD/Neck was unremarkable. Mother of patient noted that the have a appt with neurology on Monday. Plan agreeable with plan to DC home.

## 2024-09-12 NOTE — ED PROVIDER NOTE - NSFOLLOWUPINSTRUCTIONS_ED_ALL_ED_FT
Please follow up with the Neurologist on Monday as we discussed. If you develop worsening dizziness, have another episode of seizure like activity if possible record the episode, and immediately return to the Emergency Department.

## 2024-09-12 NOTE — ED ADULT NURSE NOTE - OBJECTIVE STATEMENT
Pt. received to room 28 A&Ox4 p/w dizziness and absence spells. pt. endorses periods of dizziness usually occur, but was with his s/o x4 days ago when he had an episode of absence a/w L sided shaking which lasted about 15-20 seconds, and he does not remember. pt. denies dizziness, blurry vision, lightheadedness at this time. pt. endorses L sided weakness at baseline r/t hydrocephalus w/ shunt, last surgery 2017. NAD noted at this time. respirations even and unlabored on RA. NSR on bedside cardiac monitor. pending MD chun. comfort measures provided. safety precautions maintained.

## 2024-09-12 NOTE — ED PROVIDER NOTE - ATTENDING CONTRIBUTION TO CARE
I, Олег Lopes DO,  performed the initial face to face bedside interview with this patient regarding history of present illness, review of symptoms and relevant past medical, social and family history.  I completed an independent physical examination.  I was the initial provider who evaluated this patient. I have signed out the follow up of any pending tests (i.e. labs, radiological studies) to the resident.  I have communicated the patient’s plan of care and disposition with the resident.  The history, relevant review of systems, past medical and surgical history, medical decision making, and physical examination was documented by the scribe in my presence and I attest to the accuracy of the documentation.    adult male with hx  shunt, hydrocephalus, CVA (left sided residual deficits) presents with brief episode on Sunday that sounds like sz. left hand shaking and unresponsiveness. no grand mal sz symptoms or signs. returned to baseline. no more episodes since then. triage note says 5 days of dizziness but pt retracted that and said dizziness since his last neurosurgical procedure when he was 44 y/o. in ED on my exam neuro intact, ambulatory. baseline neuro deficits are notes (left strabismus, left hand contracture, left lower ext atrophy). vitals normal . pending labs EKG and CTAs head/neck at sign out. well appearing. Mom at bedside is working on outpatient neuro follow up. pt had recent physical family med last month.

## 2024-09-12 NOTE — ED PROVIDER NOTE - NEUROLOGICAL, MLM
Alert and oriented, chronic left sided residual deficits. Contracture of left hand, no sensory deficits. Alert and oriented, chronic left sided residual deficits. Contracture of left hand, no sensory deficits. No acute focal deficits noted

## 2024-09-12 NOTE — ED PROVIDER NOTE - PATIENT PORTAL LINK FT
You can access the FollowMyHealth Patient Portal offered by [facility] by registering at the following website: [email or web address]. By joining [email or web address]’s FollowMyHealth portal, you will also be able to view your health information using other applications (apps) compatible with our system.

## 2024-09-12 NOTE — ED PROVIDER NOTE - CLINICAL SUMMARY MEDICAL DECISION MAKING FREE TEXT BOX
cbc, cmp, CTA head and neck with iv contrast 48 year old male with hx/o HTN, congenital hydrocephalus s/p mult neurological surgeries (hx/o ligated  shunt, endoscopic third ventriculostomy for hydrocephalus in 2019), Stroke @ 11 yrs old during  shunt revision per records presenting to the ED c/o dizziness. Patient reports that on Sunday 9/8 while he was playing video games with friends, he observed to be laughing and then suddenly "appeared dazed and noted to have shaking in his L hand". This episode lasted 15-20 seconds. Patient does not recall this episode happening was not confused after this episode, no urinary incontinence or tongue biting was noted. Patient states the rest of the day was normal and this episode did not happen again. The next day the patient developed intermittent dizziness described as a lightheaded sensation but only when sitting down. States that these episodes occur randomly, nothing brings on the symptoms and they resolve spontaneously. He only experiences these sx while sitting down, not with standing or laying down. He is unsure if he has had another seizure-like episode and continues to deny urinary incontinence, confusion, headache and any acute injuries. In the ED, he has not sx currently. He denies fever, chills, chest pain, SOB, abdominal pain, n/v, acute changes to his chronic L sided focal deficits s/p stroke, headache, and any other pain or sx at this time.     In the ED, patient is hypertensive otherwise stable vital signs. On exam, patient has chronic L sided deficits. L hand contracture. No sensory deficits, No acute focal deficits noted     Will order cbc, cmp, I am less concerned for acute stroke but given significant neurological hx will order CTA head and neck with iv contrast r/o aneurysm, dissection causing sx

## 2024-09-12 NOTE — ED PROVIDER NOTE - NSICDXPASTSURGICALHX_GEN_ALL_CORE_FT
PAST SURGICAL HISTORY:  Bilateral inguinal hernia     History of creation of ventriculoperitoneal shunt at couple months old in [place] - left shunt currently not working    History of surgery on arm left    S/P ventricular shunt placement 03/2019    S/P  shunt at 11 yrs old at that time patient had stroke

## 2024-09-12 NOTE — ED PROVIDER NOTE - OBJECTIVE STATEMENT
HTN, congenital hydrocephalus, Stroke @ 11 yrs old during  shunt revision 48 year old male with hx/o HTN, congenital hydrocephalus s/p  ligated  shunt. Endoscopic third ventriculostomy for hydrocephalus, Stroke @ 11 yrs old during  shunt revision presenting to the ED c/o dizziness. Patient reports that on Sunday 9/8 48 year old male with hx/o HTN, congenital hydrocephalus s/p mult neurological surgeries (hx/o ligated  shunt, endoscopic third ventriculostomy for hydrocephalus in 2019), Stroke @ 11 yrs old during  shunt revision per records presenting to the ED c/o dizziness. Patient reports that on Sunday 9/8 while he was playing video games with friends, he observed to be laughing and then suddenly "appeared dazed and noted to have shaking in his L hand". This episode lasted 15-20 seconds. Patient does not recall this episode happening was not confused after this episode, no urinary incontinence or tongue biting was noted.. Patient states the rest of the day was normal and this episode did not happen again. The next day the patient developed intermittent dizziness described as a lightheaded sensation but only when sitting down. States that these episodes occur randomly, nothing brings on the symptoms and they resolve spontaneously. He only experiences these sx while sitting down, not with standing or laying down. He is unsure if he has had another seizure-like episode and continues to deny urinary incontinence, confusion, headache and any acute injuries. In the ED, he has not sx currently. He denies fever, chills, chest pain, SOB, abdominal pain, n/v, acute changes to his chronic L sided focal deficits s/p stroke, headache, and any other pain or sx at this time.     Patient denies tobacco use, drinks alcohol occasionally.

## 2024-09-12 NOTE — ED ADULT TRIAGE NOTE - BEFAST ARM NUMBNESS
[Follow-Up Visit] : a follow-up visit for [FreeTextEntry2] : status post left shoulder, subpectoral biceps tenodesis, SAD, RICA 4.17.19. No

## 2024-09-27 ENCOUNTER — APPOINTMENT (OUTPATIENT)
Dept: MRI IMAGING | Facility: CLINIC | Age: 49
End: 2024-09-27

## 2024-09-27 ENCOUNTER — OUTPATIENT (OUTPATIENT)
Dept: OUTPATIENT SERVICES | Facility: HOSPITAL | Age: 49
LOS: 1 days | End: 2024-09-27
Payer: MEDICARE

## 2024-09-27 DIAGNOSIS — Z98.890 OTHER SPECIFIED POSTPROCEDURAL STATES: Chronic | ICD-10-CM

## 2024-09-27 DIAGNOSIS — Z92.89 PERSONAL HISTORY OF OTHER MEDICAL TREATMENT: Chronic | ICD-10-CM

## 2024-09-27 DIAGNOSIS — Z98.2 PRESENCE OF CEREBROSPINAL FLUID DRAINAGE DEVICE: Chronic | ICD-10-CM

## 2024-09-27 DIAGNOSIS — K40.20 BILATERAL INGUINAL HERNIA, WITHOUT OBSTRUCTION OR GANGRENE, NOT SPECIFIED AS RECURRENT: Chronic | ICD-10-CM

## 2024-09-27 DIAGNOSIS — Z00.00 ENCOUNTER FOR GENERAL ADULT MEDICAL EXAMINATION WITHOUT ABNORMAL FINDINGS: ICD-10-CM

## 2024-09-27 PROCEDURE — 70553 MRI BRAIN STEM W/O & W/DYE: CPT | Mod: 26,MH

## 2024-09-27 PROCEDURE — A9585: CPT

## 2024-09-27 PROCEDURE — 70553 MRI BRAIN STEM W/O & W/DYE: CPT | Mod: MH

## 2024-10-31 ENCOUNTER — APPOINTMENT (OUTPATIENT)
Dept: NEUROLOGY | Facility: CLINIC | Age: 49
End: 2024-10-31

## 2025-05-16 NOTE — PACU DISCHARGE NOTE - NAUSEA/VOMITING:
None
Term Goals: To be achieved in: 3 weeks  1. Independent in HEP and progression per patient tolerance, in order to prevent re-injury.   [] Progressing: [] Met: [] Not Met: [] Adjusted  2. Patient will have a decrease in pain to <5/10 to facilitate improvement in movement, function, and ADLs as indicated by Functional Deficits.  [] Progressing: [] Met: [] Not Met: [] Adjusted    Long Term Goals: To be achieved in: 6 weeks  1. Disability index score of 20% or less for the Modified Oswestry to assist with reaching prior level of function with activities such as work.  [] Progressing: [] Met: [] Not Met: [] Adjusted  2. Patient will demonstrate increased AROM of trunk to WFL without pain to allow for proper joint functioning to enable patient to return to work.   [] Progressing: [] Met: [] Not Met: [] Adjusted  3. Patient will demonstrate increased Strength of LEs to at least 5/5 throughout without pain to allow for proper functional mobility to enable patient to return to ADLs.   [] Progressing: [] Met: [] Not Met: [] Adjusted  4. Patient will return to all transitional movements without increased symptoms or restriction.   [] Progressing: [] Met: [] Not Met: [] Adjusted      Overall Progression Towards Functional goals/ Treatment Progress Update:  [] Patient is progressing as expected towards functional goals listed.    [] Progression is slowed due to complexities/Impairments listed.  [] Progression has been slowed due to co-morbidities.  [x] Plan just implemented, too soon (<30days) to assess goals progression   [] Goals require adjustment due to lack of progress  [] Patient is not progressing as expected and requires additional follow up with physician  [] Other:     TREATMENT PLAN     Frequency/Duration: 1x/week for 6 weeks for the following treatment interventions:    Interventions:  Therapeutic Exercise (53936) including: strength training, ROM, and functional mobility  Therapeutic Activities (92736) including: